# Patient Record
Sex: FEMALE | Race: WHITE
[De-identification: names, ages, dates, MRNs, and addresses within clinical notes are randomized per-mention and may not be internally consistent; named-entity substitution may affect disease eponyms.]

---

## 2021-09-15 ENCOUNTER — HOSPITAL ENCOUNTER (OUTPATIENT)
Dept: HOSPITAL 75 - PREOP | Age: 56
LOS: 1 days | End: 2021-09-16
Attending: ORTHOPAEDIC SURGERY
Payer: MEDICARE

## 2021-09-15 VITALS — DIASTOLIC BLOOD PRESSURE: 74 MMHG | SYSTOLIC BLOOD PRESSURE: 107 MMHG

## 2021-09-15 VITALS — WEIGHT: 184.75 LBS | HEIGHT: 64.02 IN | BODY MASS INDEX: 31.54 KG/M2

## 2021-09-15 DIAGNOSIS — Z01.812: Primary | ICD-10-CM

## 2021-09-15 DIAGNOSIS — M17.12: ICD-10-CM

## 2021-09-15 LAB
ALBUMIN SERPL-MCNC: 3.9 GM/DL (ref 3.2–4.5)
ALP SERPL-CCNC: 65 U/L (ref 40–136)
ALT SERPL-CCNC: 16 U/L (ref 0–55)
AMORPH SED URNS QL MICRO: (no result) /LPF
APTT PPP: YELLOW S
BACTERIA #/AREA URNS HPF: (no result) /HPF
BASOPHILS # BLD AUTO: 0 10^3/UL (ref 0–0.1)
BASOPHILS NFR BLD AUTO: 1 % (ref 0–10)
BILIRUB SERPL-MCNC: 0.2 MG/DL (ref 0.1–1)
BILIRUB UR QL STRIP: NEGATIVE
BUN/CREAT SERPL: 26
CALCIUM SERPL-MCNC: 9.4 MG/DL (ref 8.5–10.1)
CHLORIDE SERPL-SCNC: 107 MMOL/L (ref 98–107)
CO2 SERPL-SCNC: 21 MMOL/L (ref 21–32)
CREAT SERPL-MCNC: 0.93 MG/DL (ref 0.6–1.3)
EOSINOPHIL # BLD AUTO: 0.2 10^3/UL (ref 0–0.3)
EOSINOPHIL NFR BLD AUTO: 4 % (ref 0–10)
ERYTHROCYTE [SEDIMENTATION RATE] IN BLOOD: 20 MM/HR (ref 0–30)
FIBRINOGEN PPP-MCNC: CLEAR MG/DL
GFR SERPLBLD BASED ON 1.73 SQ M-ARVRAT: 62 ML/MIN
GLUCOSE SERPL-MCNC: 83 MG/DL (ref 70–105)
GLUCOSE UR STRIP-MCNC: NEGATIVE MG/DL
HCT VFR BLD CALC: 40 % (ref 35–52)
HGB BLD-MCNC: 12.7 G/DL (ref 11.5–16)
INR PPP: 0.9 (ref 0.8–1.4)
KETONES UR QL STRIP: NEGATIVE
LEUKOCYTE ESTERASE UR QL STRIP: NEGATIVE
LYMPHOCYTES # BLD AUTO: 1.2 10^3/UL (ref 1–4)
LYMPHOCYTES NFR BLD AUTO: 28 % (ref 12–44)
MANUAL DIFFERENTIAL PERFORMED BLD QL: NO
MCH RBC QN AUTO: 28 PG (ref 25–34)
MCHC RBC AUTO-ENTMCNC: 32 G/DL (ref 32–36)
MCV RBC AUTO: 88 FL (ref 80–99)
MONOCYTES # BLD AUTO: 0.4 10^3/UL (ref 0–1)
MONOCYTES NFR BLD AUTO: 10 % (ref 0–12)
NEUTROPHILS # BLD AUTO: 2.6 10^3/UL (ref 1.8–7.8)
NEUTROPHILS NFR BLD AUTO: 58 % (ref 42–75)
NITRITE UR QL STRIP: NEGATIVE
PH UR STRIP: 6 [PH] (ref 5–9)
PLATELET # BLD: 261 10^3/UL (ref 130–400)
PMV BLD AUTO: 10.4 FL (ref 9–12.2)
POTASSIUM SERPL-SCNC: 4.7 MMOL/L (ref 3.6–5)
PROT SERPL-MCNC: 6.8 GM/DL (ref 6.4–8.2)
PROT UR QL STRIP: NEGATIVE
PROTHROMBIN TIME: 12 SEC (ref 12.2–14.7)
RBC #/AREA URNS HPF: (no result) /HPF
SODIUM SERPL-SCNC: 137 MMOL/L (ref 135–145)
SP GR UR STRIP: >=1.03 (ref 1.02–1.02)
SQUAMOUS #/AREA URNS HPF: (no result) /HPF
WBC # BLD AUTO: 4.4 10^3/UL (ref 4.3–11)
WBC #/AREA URNS HPF: (no result) /HPF

## 2021-09-15 PROCEDURE — 36415 COLL VENOUS BLD VENIPUNCTURE: CPT

## 2021-09-15 PROCEDURE — 86900 BLOOD TYPING SEROLOGIC ABO: CPT

## 2021-09-15 PROCEDURE — 86901 BLOOD TYPING SEROLOGIC RH(D): CPT

## 2021-09-15 PROCEDURE — 85610 PROTHROMBIN TIME: CPT

## 2021-09-15 PROCEDURE — 71046 X-RAY EXAM CHEST 2 VIEWS: CPT

## 2021-09-15 PROCEDURE — 87081 CULTURE SCREEN ONLY: CPT

## 2021-09-15 PROCEDURE — 80053 COMPREHEN METABOLIC PANEL: CPT

## 2021-09-15 PROCEDURE — 81000 URINALYSIS NONAUTO W/SCOPE: CPT

## 2021-09-15 PROCEDURE — 87088 URINE BACTERIA CULTURE: CPT

## 2021-09-15 PROCEDURE — 85025 COMPLETE CBC W/AUTO DIFF WBC: CPT

## 2021-09-15 PROCEDURE — 86850 RBC ANTIBODY SCREEN: CPT

## 2021-09-15 PROCEDURE — 85652 RBC SED RATE AUTOMATED: CPT

## 2021-09-15 NOTE — DIAGNOSTIC IMAGING REPORT
INDICATION:  preop left knee replacement.  



TECHNIQUE:  Two view chest   12:49 PM



CORRELATION STUDY:   None



FINDINGS: 

The heart size, mediastinal configuration and pulmonary

vasculature are within normal limits.  

The lungs are clear with no consolidating infiltrate. There is no

significant pleural effusion or pneumothorax.  

Visualized osseous structures are unremarkable.



IMPRESSION: 

1. Negative for acute abnormality of the chest.



Dictated by: 



  Dictated on workstation # [id number]

## 2021-09-16 NOTE — HISTORY AND PHYSICAL
DATE OF SERVICE:  



ADMISSION HISTORY AND PHYSICAL



DATE OF ADMISSION:

2021.



Her date of service and surgery and date of admission will be 2021 for a

left total knee arthroplasty.  The patient will require regular inpatient

admission due to comorbidities, pain management and need for physical therapy.



HISTORY OF PRESENT ILLNESS:

The patient is a 56-year-old female with progressively worsening left knee pain.

 Radiographs reveal severe tricompartmental osteoarthritis.  She has undergone

treatment with injections, anti-inflammatories and rest without relief.  Due to

functional impairment and failure to improve with conservative measures, the

patient elected to proceed with surgical intervention.



REVIEW OF SYSTEMS:

No chest pain, no shortness of breath, and no dysuria.



PAST MEDICAL HISTORY:

Back pain, constipation, reflux, allergic rhinitis, depression, hypothyroidism,

seizure disorder, and bipolar disorder.



PAST SURGICAL HISTORY:

Left shoulder arthroscopy, , tubal ligation, and left wrist ORIF.



FAMILY HISTORY:

Significant for diabetes, hypothyroidism, and coronary artery disease.



PRIMARY CARE PROVIDER:

Kishor Juarez through [facility].



MEDICATIONS:

Levothyroxine, Ambien and promethazine.



ALLERGIES:

CARIPRAZINE.



SOCIAL HISTORY:

The patient denies alcohol and tobacco use.



PHYSICAL EXAMINATION:

GENERAL:  The patient is well-developed, well-nourished, in no acute distress.

HEENT:  Normocephalic and atraumatic.  Pupils are equal, round and reactive to

light.  Oropharynx is clear.

LUNGS:  Clear to auscultation bilaterally.

HEART:  Regular rate and rhythm.

ABDOMEN:  Soft, nontender, and nondistended.

EXTREMITIES:  The left knee demonstrates varus alignment.  She has marked

tenderness along the medial joint line.  She has patellofemoral crepitus.  No

varus or valgus laxity.  Negative anterior and posterior drawer.  Slight

effusions noted.  Range of motion is 0/2/120.



IMPRESSION:

Severe left knee osteoarthritis, unresponsive to conservative measures.



PLAN:

Left total knee arthroplasty.  The risks, benefits, options, ramifications and

recovery have been discussed at length with the patient.  She understands and

wishes to proceed.





Job ID: [id number]

DocumentID: [id number]

Dictated Date:  2021 12:29:38

Transcription Date: 2021 12:52:36

Dictated By: STONEY GASPAR MD

## 2021-09-22 ENCOUNTER — HOSPITAL ENCOUNTER (INPATIENT)
Dept: HOSPITAL 75 - 4TH | Age: 56
LOS: 3 days | Discharge: HOME HEALTH | DRG: 470 | End: 2021-09-25
Attending: ORTHOPAEDIC SURGERY | Admitting: ORTHOPAEDIC SURGERY
Payer: MEDICARE

## 2021-09-22 VITALS — SYSTOLIC BLOOD PRESSURE: 138 MMHG | DIASTOLIC BLOOD PRESSURE: 93 MMHG

## 2021-09-22 VITALS — SYSTOLIC BLOOD PRESSURE: 133 MMHG | DIASTOLIC BLOOD PRESSURE: 85 MMHG

## 2021-09-22 VITALS — DIASTOLIC BLOOD PRESSURE: 78 MMHG | SYSTOLIC BLOOD PRESSURE: 145 MMHG

## 2021-09-22 VITALS — HEIGHT: 63.78 IN | BODY MASS INDEX: 31.93 KG/M2 | WEIGHT: 184.75 LBS

## 2021-09-22 VITALS — DIASTOLIC BLOOD PRESSURE: 87 MMHG | SYSTOLIC BLOOD PRESSURE: 144 MMHG

## 2021-09-22 VITALS — SYSTOLIC BLOOD PRESSURE: 131 MMHG | DIASTOLIC BLOOD PRESSURE: 86 MMHG

## 2021-09-22 VITALS — DIASTOLIC BLOOD PRESSURE: 91 MMHG | SYSTOLIC BLOOD PRESSURE: 142 MMHG

## 2021-09-22 VITALS — DIASTOLIC BLOOD PRESSURE: 91 MMHG | SYSTOLIC BLOOD PRESSURE: 164 MMHG

## 2021-09-22 VITALS — SYSTOLIC BLOOD PRESSURE: 123 MMHG | DIASTOLIC BLOOD PRESSURE: 78 MMHG

## 2021-09-22 VITALS — SYSTOLIC BLOOD PRESSURE: 134 MMHG | DIASTOLIC BLOOD PRESSURE: 88 MMHG

## 2021-09-22 VITALS — DIASTOLIC BLOOD PRESSURE: 76 MMHG | SYSTOLIC BLOOD PRESSURE: 111 MMHG

## 2021-09-22 VITALS — DIASTOLIC BLOOD PRESSURE: 94 MMHG | SYSTOLIC BLOOD PRESSURE: 156 MMHG

## 2021-09-22 VITALS — SYSTOLIC BLOOD PRESSURE: 133 MMHG | DIASTOLIC BLOOD PRESSURE: 73 MMHG

## 2021-09-22 DIAGNOSIS — E03.9: ICD-10-CM

## 2021-09-22 DIAGNOSIS — M54.9: ICD-10-CM

## 2021-09-22 DIAGNOSIS — M17.12: Primary | ICD-10-CM

## 2021-09-22 DIAGNOSIS — J30.9: ICD-10-CM

## 2021-09-22 DIAGNOSIS — G40.909: ICD-10-CM

## 2021-09-22 DIAGNOSIS — F31.9: ICD-10-CM

## 2021-09-22 DIAGNOSIS — K21.9: ICD-10-CM

## 2021-09-22 PROCEDURE — 86901 BLOOD TYPING SEROLOGIC RH(D): CPT

## 2021-09-22 PROCEDURE — 86900 BLOOD TYPING SEROLOGIC ABO: CPT

## 2021-09-22 PROCEDURE — 0SRD0J9 REPLACEMENT OF LEFT KNEE JOINT WITH SYNTHETIC SUBSTITUTE, CEMENTED, OPEN APPROACH: ICD-10-PCS | Performed by: ORTHOPAEDIC SURGERY

## 2021-09-22 PROCEDURE — 73560 X-RAY EXAM OF KNEE 1 OR 2: CPT

## 2021-09-22 PROCEDURE — 94664 DEMO&/EVAL PT USE INHALER: CPT

## 2021-09-22 PROCEDURE — 36415 COLL VENOUS BLD VENIPUNCTURE: CPT

## 2021-09-22 PROCEDURE — 86850 RBC ANTIBODY SCREEN: CPT

## 2021-09-22 PROCEDURE — 85014 HEMATOCRIT: CPT

## 2021-09-22 PROCEDURE — 85018 HEMOGLOBIN: CPT

## 2021-09-22 RX ADMIN — OXYCODONE HYDROCHLORIDE AND ACETAMINOPHEN PRN TAB: 5; 325 TABLET ORAL at 23:30

## 2021-09-22 RX ADMIN — SODIUM CHLORIDE SCH MLS/HR: 900 INJECTION, SOLUTION INTRAVENOUS at 12:10

## 2021-09-22 RX ADMIN — CEFUROXIME SCH MLS/HR: 750 INJECTION, POWDER, FOR SOLUTION INTRAMUSCULAR; INTRAVENOUS at 15:37

## 2021-09-22 RX ADMIN — DOCUSATE SODIUM AND SENNOSIDES SCH EA: 8.6; 5 TABLET, FILM COATED ORAL at 11:52

## 2021-09-22 RX ADMIN — DOCUSATE SODIUM AND SENNOSIDES SCH EA: 8.6; 5 TABLET, FILM COATED ORAL at 19:27

## 2021-09-22 RX ADMIN — CEFUROXIME SCH MLS/HR: 750 INJECTION, POWDER, FOR SOLUTION INTRAMUSCULAR; INTRAVENOUS at 23:30

## 2021-09-22 RX ADMIN — OXYCODONE HYDROCHLORIDE AND ACETAMINOPHEN PRN TAB: 5; 325 TABLET ORAL at 12:06

## 2021-09-22 RX ADMIN — SODIUM CHLORIDE SCH MLS/HR: 900 INJECTION, SOLUTION INTRAVENOUS at 20:40

## 2021-09-22 RX ADMIN — SODIUM CHLORIDE, SODIUM LACTATE, POTASSIUM CHLORIDE, AND CALCIUM CHLORIDE PRN MLS/HR: 600; 310; 30; 20 INJECTION, SOLUTION INTRAVENOUS at 06:52

## 2021-09-22 RX ADMIN — SODIUM CHLORIDE, SODIUM LACTATE, POTASSIUM CHLORIDE, AND CALCIUM CHLORIDE PRN MLS/HR: 600; 310; 30; 20 INJECTION, SOLUTION INTRAVENOUS at 07:55

## 2021-09-22 NOTE — PROGRESS NOTE
Standard Progress Note


Progress Notes/Assess & Plan


Date Seen by a Provider:  Sep 22, 2021


Time Seen by a Provider:  09:30


Progress/Assessment & Plan


post  op check


no complaints


radiographs--HW well positioned without fracture


LLE--dressing intact. 2 plus DP pulse with brisk cap refill. INtact DF and PF of

toes and ankle. Sensation intact to light touch throughout


s/p LTKA


mobilize as able











STONEY GASPAR MD            Sep 22, 2021 10:15

## 2021-09-22 NOTE — XMS REPORT
Clinical Summary

                             Created on: 2021



Riri Patino

External Reference #: [id number]

: 1965

Sex: Female



Demographics





                          Address                   [address] [place]  [address]

 

                          Home Phone                [phone]

 

                          Preferred Language        English

 

                          Marital Status            

 

                          Adventism Affiliation     Unknown

 

                          Race                      White

 

                          Ethnic Group              Not  or 





Author





                          Author                    [facility]

 

                          Organization              [facility]

 

                          Address                   Unknown

 

                          Phone                     Unavailable







Support





                Name            Relationship    Address         Phone

 

                No Contact      [employer]            Unknown         [phone]







Care Team Providers





                    Care Team Member Name Role                Phone

 

                          PCP                       Unavailable







Allergies

Not on File



Medications

Not on file



Active Problems





Not on file



Social History





                                        Date



                 Tobacco Use     Types           Packs/Day       Years Used 

 

                                         



                                         Never Assessed    







 



                           Sex Assigned at Birth     Date Recorded

 

 



                                         Not on file 







Last Filed Vital Signs

Not on file



Plan of Treatment





   



                 Health Maintenance  Due Date        Last Done       Comments

 

   



                           Hepatitis C Screen        1965  

 

   



                           Td/Tdap#                  1965  

 

   



                           COVID-19 Vaccine (1)      1977  

 

   



                           Cervical Cancer Screening  1986  



                                         via Pap Smear   

 

   



                           Colorectal Screening via  2015  



                                         Colonoscopy   

 

   



                           Mammogram Screening       2015  

 

   



                           Zoster Vaccine# (1 of 2)  2015  

 

   



                     Influenza Vaccine (#1)  10/01/2021          10/25/2019, 



                                         10/25/2019, 



                                         10/11/2018, 



                                         Additional 



                                         history 



                                         exists 

 

   



                     Pneumococcal Vaccine:  Aged Out            No longer eligib

le based on patient's age to



                           Pediatrics (0 to 5 Years)    complete this topic



                                         and At-Risk Patients (6   



                                         to 64 Years)   







Results

Not on filefrom Last 3 Months



Insurance





                                        Type



            Payer      Benefit    Subscriber ID  Effective  Phone      Address 



                           Plan /                    Dates   



                                         Group     

 

                                         



                 WORKERS COMPENSATION  MISC WORK       [id number]      

3   



                           COMP                      -Present   







     



            Guarantor Name  Account    Relation to  Date of    Phone      Billin

g Address



                     Type                Patient             Birth  

 

     



              Riri Patino  Workers      Self         1965   [address] [place] [address]







Advance Directives





For more information, please contact: [phone]







                          Patient Representative    Explanation



                           Type                      Date Recorded  

 

                                                     



                                         Health Care   



                                         Directive

## 2021-09-22 NOTE — OPERATIVE REPORT
DATE OF SERVICE:  09/22/2021



PREOPERATIVE DIAGNOSIS:

Left knee primary osteoarthritis.



POSTOPERATIVE DIAGNOSIS:

Left knee primary osteoarthritis.



PROCEDURE:

Left total knee arthroplasty.



SURGEON:

Artis Gaspar MD



ASSISTANT:

Paramjit Keene, who assisted throughout the procedure and closed the incision.



ANESTHESIA:

General endotracheal by Se Dailey CRNA.



TOURNIQUET TIME:

Approximately 65 minutes at 300 mmHg.



ESTIMATED BLOOD LOSS:

Minimal.



DRAINS:

None.



COMPLICATIONS:

None.



POSTOPERATIVE PLAN:

Routine total knee arthroplasty protocol.  The patient was transferred to the

recovery room awake and in stable condition.



STATEMENT OF MEDICAL NECESSITY:

The patient is a 56-year-old female with long-standing left knee pain.  She has

undergone treatment with injections, anti-inflammatories and rest without

relief.  Radiographs revealed severe osteoarthritis primarily in her medial and

patellofemoral compartments.  Due to functional impairment and failure to

improve with conservative measures, the patient elected to proceed with surgical

intervention.



DESCRIPTION OF PROCEDURE:

After risks and benefits of procedure were discussed and questions were

answered, an informed consent was signed and placed on chart, the operative site

was confirmed in the preoperative holding area initialed by the surgeon.  The

patient was then transferred to the operating room and after adequate levels of

general endotracheal anesthetic were obtained, a timeout was called, confirming

the operative site and examination under anesthesia was performed and the left

lower extremity was prepped and draped in the usual sterile fashion.  With the

leg elevated and the knee flexed, the tourniquet was inflated to 300 mmHg. 

Standard anterior approach was utilized.  Hemostasis was obtained with cautery. 

Medial parapatellar arthrotomy was performed leaving 1 cm cuff on the patella

for later reattachment.  A portion of the fat pad was resected.  Subperiosteal

release was performed in the proximal medial tibia with curved osteotome.  The

ACL was resected.  The intramedullary guide was passed into the distal femur. 

The distal cut was made and the femur sized to a size 3.  The 3 cutting block

was placed parallel to the epicondylar axis and the cuts were made from

posterior to anterior.  Subperiosteal release was then carefully performed on

the posterior distal femur being careful to stay on the bony surface.  The

intramedullary guide was then passed into the tibia.  The cutting block was

pinned into position.  The drop damaso transected the intermalleolar axis and the

cut was made.  This was then prepared with the drill and keel punch using the

three block again ensuring that the drop damaso transected the intermalleolar axis.

 The femoral trial was placed and the trochlear cut was made.  A 10 mm insert

was placed and the patella was prepared by resecting 10 mm off, the undersurface

was then prepared with peg guide.  The trials were inserted with a 32 mm trial

in the patella.  Full extension was easily obtained, 120 degrees of flexion with

gravity was easily obtained.  There was no anterior/posterior or medial/lateral

laxity in flexion or extension.  The patella tracked well.  The trials were

removed.  The joint was irrigated with pulse lavage as were the bone ends.  The

periarticular block was placed in the posterior capsule, medial and lateral

retinaculum extensor mechanism and subcutaneous tissues.  The bone ends were

further irrigated and dried.  The tibial baseplate was cemented into position. 

Excessive cement was removed, the superior surface was irrigated and dried.  The

polyethylene insert was placed.  The distal femur was irrigated and dried.  The

final femoral prosthesis was cemented into position.  Excessive cement was

removed.  The knee was brought out into full extension and held until the cement

had cured.  The undersurface of patella was irrigated and dried and the patellar

button was cemented into position.  Once the cement had cured, the knee was

taken through range of motion and the patella tracked well.  Full extension was

easily obtained, 120 degrees of flexion with gravity was easily obtained.  There

was no anterior/posterior or medial/lateral laxity in flexion or extension.  The

joint was further irrigated with pulse lavage.  Arthrotomy was closed with #2

Tevdek in figure-of-eight interrupted fashion.  Knee was flexed.  The repair was

stable with patella tracking well.  Subcutaneous tissues were irrigated using a

total of 6 liters throughout the procedure.  A 0 Vicryl was used for deep

subcutaneous tissue, 2-0 Vicryl for the superficial subcutaneous tissue, staples

were used on the skin.  A soft dressing was applied.  The tourniquet was

deflated and the patient was transferred to the recovery room awake and in

stable condition.





Job ID: [id number]

DocumentID: [id number]

Dictated Date:  09/22/2021 09:15:39

Transcription Date: 09/22/2021 14:06:58

Dictated By: ARTIS GASPAR MD

## 2021-09-22 NOTE — PROGRESS NOTE-PRE OPERATIVE
Pre-Operative Progress Note


H&P Reviewed


The H&P was reviewed, patient examined and no changes noted.


Date Seen by Provider:  Sep 22, 2021


Time Seen by Provider:  07:20


Date H&P Reviewed:  Sep 22, 2021


Time H&P Reviewed:  07:11


Pre-Operative Diagnosis:  left knee primary osteoarthritis











STONEY GASPAR MD            Sep 22, 2021 07:32

## 2021-09-22 NOTE — PHYSICAL THERAPY EVALUATION
PT Evaluation-General


Medical Diagnosis


Admission Date


Sep 22, 2021 at 05:54


Medical Diagnosis:  Left TKA


Onset Date:  Sep 21, 2021





Therapy Diagnosis


Therapy Diagnosis:  Gait deficit, strength deficit





Precautions


Precautions/Isolations:  Fall Prevention, Standard Precautions





Weight Bear Status


Left Lower Extremity:  Left


Weight Bearing/Tolerated





Referral


Physician:  Dr. Torres


Reason for Referral:  Evaluation/Treatment





Social History


Home:  [facility]


Current Living Status:  Parents


Entry Into Home:  Stairs With Railing


PT Steps Into Home:  1


PT Steps Inside Home:  15





Prior


Prior Level of Function


SCALE: Activities may be completed with or without assistive devices.





6-Indepedent-patient completes the activity by him/herself with no assistance 

from a helper.


5-Set-up or Clean-up Assistance-helper sets up or cleans up; patient completes 

activity. Crocker assists only prior to or  


    following the activity.


4-Supervision or Touching Assistance-helper provides verbal cues and/or 

touching/steadying and/or contact guard assistance as patient completes 

activity. Assistance may be provided   


    throughout the activity or intermittently.


3-Partial/Moderate Assistance-helper does LESS THAN HALF the effort. Crocker 

lifts, holds or supports trunk or limbs, but provides less than half the effort.


2-Substantial/Maximal Assistance-helper does MORE THAN HALF the effort. Crocker 

lifts or holds trunk or limbs and provides more than half the effort.


1-Fwrjyliun-zgffum does ALL the effort. Patient does none of the effort to 

complete the activity. Or, the assistance of 2 or more helpers is required for 

the patient to complete the  


    activity.


If activity was not attempted, code reason:


7-Patient Refused.


9-Not Applicable-not attempted and the patient did not perform the activity 

before the current illness, exacerbation or injury.


10-Not Attempted due to Environmental Limitations-(lack of equipment, weather 

restraints, etc.).


88-Not Attempted due to Medical Conditions or Safety Concerns.


Bed Mobility:  6


Transfers (B,C,W/C):  6


Gait:  6


Stairs:  6


Indoor Mobility (Ambulation):  Independent


Stairs:  Independent





PT Evaluation-Current


Subjective


Patient lying supine in bed upon PT arrival, agreeable to treatment.  Patient 

reports 4/10 pain in left knee.





ROM/Strength


ROM Lower Extremities


Left knee extension 10 degrees from neutral 


Left knee flexion 70 degrees


Strength Lower Extremities


Right LE 4/5 grossly


Left LE 3-/5 all available planes





Sensory


Vision:  Functional


Hearing:  Functional


Sensation Right Lower Extremit:  Intact


Sensation Left Lower Extremity:  Impaired


Sensation Lower Extremities


Patient reports she is unable to feel light touch below left knee





Transfers


Roll Left to Right (QC):  5


Sit to Lying (QC):  5


Lying to Sitting/Side of Bed(Q:  5


Sit to Stand (QC):  5


Chair/Bed-to-Chair Xfer(QC):  4





Gait


Does the Patient Walk?:  Yes


Mode of Locomotion:  Walk


Anticipated Mode of Locomotion:  Walk


Walk 10 feet (QC):  4


Distance:  15


Gait Assistive Device:  FWW





Balance


Sitting Static:  Good


Sitting Dynamic:  Fair


Standing Static:  Fair





Assessment/Needs


Patient tolerated treatment well.  She performed all observed bed mobility and 

transfers with SBA/CGA.  Patient ambulates 15 feet to the chair, with FWW, with 

CGA and verbal cues for safety, progression of left LE and use of FWW.  Patient 

ambulates with antalgic gait pattern, little to no stance time on the left LE, 

with hop to gait pattern on the right LE.  Patient in chair post treatment with 

all needs met, nursing notified, call light in reach.  CPM fit appropriately to 

left knee and set at 0 degrees extension and 75 Degrees flexion.  Ran the cynthia

e through 4 cycles to ensure fit was appropriate.


Rehab Potential:  Good





PT Long Term Goals


Long Term Goals


PT Long Term Goals Time Frame:  Sep 29, 2021


Roll Left & Right (QC):  6


Sit to Lying (QC):  6


Lying-Sitting on Side/Bed(QC):  6


Sit to Stand (QC):  6


Chair/Bed-to-Chair Xfer(QC):  6


Toilet Transfer (QC):  6


Car Transfer (QC):  6


Does the Patient Walk:  Yes


Walk 10 feet (QC):  6


Walk 50ft with 2 Turns (QC):  6


Walk 150 ft (QC):  5


1 Step (curb) (QC):  4


4 Steps (QC):  4


12 Steps (QC):  4





PT Plan


Problem List


Problem List:  Activity Tolerance, Functional Strength, Safety, Balance, Gait, 

Transfer, Bed Mobility, ROM





Treatment/Plan


Treatment Plan:  Continue Plan of Care


Treatment Plan:  Bed Mobility, Education, Functional Activity Krupa, Functional 

Strength, Group Therapy, Gait, Safety, Therapeutic Exercise, Transfers


Treatment Duration:  Nov 24, 2021


Frequency:  11 times per week


Estimated Hrs Per Day:  .25 hour per day





Safety Risks/Education


Patient Education:  Gait Training, Transfer Techniques, Reviewed Precautions


Teaching Recipient:  Patient


Teaching Methods:  Demonstration, Discussion


Response to Teaching:  Verbalize Understanding, Return Demonstration





Discharge Recommendations


Equpiment Recommendations-D/C:  Front Wheeled Walker





Time/GCodes


Time In:  1325


Time Out:  1355


Total Billed Treatment Time:  30


Total Billed Treatment


Visit, Jens Altman, CPM











JAMIE GOODMAN PT                Sep 22, 2021 14:29

## 2021-09-22 NOTE — PROGRESS NOTE-POST OPERATIVE
Post-Operative Progess Note


Surgeon (s)/Assistant (s)


Surgeon


STONEY GASPAR MD


Assistant:  Paramjit Keene





Pre-Operative Diagnosis


left knee primary osteoarthritis





Post-Operative Diagnosis





left knee primary osteoarthritis





Procedure & Operative Findings


Date of Procedure


9/22/21


Procedure Performed/Findings


left total knee arthroplasty


Anesthesia Type


GETA





Estimated Blood Loss


Estimated blood loss (mL):  minimal





Specimens/Packing


Specimens Removed


none


Packing:  


none











STONEY GASPAR MD            Sep 22, 2021 07:32

## 2021-09-22 NOTE — D/C HH FACE TO FACE ORDER
D/C  Face to Face Orders


Reconcile Patient Problems


Problems Reviewed?:  Yes





Instructions for Patient


[facility], [phone]


Patient Instructions/FollowUp:  


three weeks


Physician to follow Patient:  three weeks


Discharge Diet for Home:  Regular Diet





Patient Data-Allergies,Ht & Wt


Patient Allergies:  


Coded Allergies:  


     lurasidone (Verified  Allergy, Unknown, "wanted to kill people", 9/15/21)


     propranolol (Verified  Allergy, Unknown, swollen eyes and dizziness, 

9/15/21)





Home Health Need/Face to Face


Date of Face to Face:  Sep 22, 2021


Clinical Findings:  Instability, Muscle weakness, Pain with ambulation, Unsteady

gait


I have seen Pt face-to-face:  Yes


Discharged To:  Home


Diagnosis/Conditions:  


left total knee arthroplasty


Patient is Homebound due to:  Negrita fall risk due to instabilty, Muscle weaknes

s, Pain w/ambulation


Homebound Status


   Due to the above stated illness, injury or surgical procedure (medical 

condition or diagnosis) and associated clinical findings, the patient is 

homebound because of his/her inability to leave home except with aid of a 

supportive device and/or person AND leaving the home requires a considerable and

taxing effort or is medically contraindicated.


Pt req the following assistanc:  Walker





Home Health Nursing Orders


Home Health Services Order:  Physical Therapy-Evaluate & Treat





DC left knee staples and apply steri strips 10/6/21





Home Health Infusion Therapy


Line Start Date:  Sep 22, 2021





Therapy Orders


Therapy Orders:  Physical Therapy, PT to assess for OT


Therapy Specific Orders:  Eval assistive deivces, Teach enviro 

modifications/safety, Gait training, Increase strength/endurance, Provider 

maintenance therapy, Restore ROM


Certify Stmt


I certify that this patient is under my care and that I, a nurse practitioner or

a physician; a assistant working with me, had a face to face encounter that -

meets the physician face to face encounter requirements with this patient as 

dated.











STONEY GASPAR MD            Sep 22, 2021 07:35

## 2021-09-22 NOTE — DIAGNOSTIC IMAGING REPORT
INDICATION: Osteoarthritis.



2 views were obtained.



FINDINGS: There are postsurgical changes of left knee

arthroplasty. Hardware is in satisfactory position. There is no

fracture or dislocation.



IMPRESSION: Stable postsurgical changes of left knee

arthroplasty.



Dictated by: 



  Dictated on workstation # [id number]

## 2021-09-23 VITALS — DIASTOLIC BLOOD PRESSURE: 85 MMHG | SYSTOLIC BLOOD PRESSURE: 123 MMHG

## 2021-09-23 VITALS — SYSTOLIC BLOOD PRESSURE: 133 MMHG | DIASTOLIC BLOOD PRESSURE: 87 MMHG

## 2021-09-23 VITALS — DIASTOLIC BLOOD PRESSURE: 87 MMHG | SYSTOLIC BLOOD PRESSURE: 151 MMHG

## 2021-09-23 VITALS — DIASTOLIC BLOOD PRESSURE: 81 MMHG | SYSTOLIC BLOOD PRESSURE: 140 MMHG

## 2021-09-23 VITALS — SYSTOLIC BLOOD PRESSURE: 131 MMHG | DIASTOLIC BLOOD PRESSURE: 82 MMHG

## 2021-09-23 VITALS — SYSTOLIC BLOOD PRESSURE: 102 MMHG | DIASTOLIC BLOOD PRESSURE: 66 MMHG

## 2021-09-23 LAB
HCT VFR BLD CALC: 34 % (ref 35–52)
HGB BLD-MCNC: 10.7 G/DL (ref 11.5–16)

## 2021-09-23 RX ADMIN — OXYCODONE HYDROCHLORIDE AND ACETAMINOPHEN PRN TAB: 5; 325 TABLET ORAL at 08:05

## 2021-09-23 RX ADMIN — OXYCODONE HYDROCHLORIDE AND ACETAMINOPHEN PRN TAB: 5; 325 TABLET ORAL at 23:38

## 2021-09-23 RX ADMIN — ZOLPIDEM TARTRATE PRN MG: 5 TABLET ORAL at 23:38

## 2021-09-23 RX ADMIN — ASPIRIN SCH MG: 81 TABLET ORAL at 08:05

## 2021-09-23 RX ADMIN — Medication SCH EA: at 05:25

## 2021-09-23 RX ADMIN — ENOXAPARIN SODIUM SCH MG: 30 INJECTION SUBCUTANEOUS at 19:41

## 2021-09-23 RX ADMIN — OXYCODONE HYDROCHLORIDE AND ACETAMINOPHEN PRN TAB: 5; 325 TABLET ORAL at 16:02

## 2021-09-23 RX ADMIN — OXYCODONE HYDROCHLORIDE AND ACETAMINOPHEN PRN TAB: 5; 325 TABLET ORAL at 17:37

## 2021-09-23 RX ADMIN — SODIUM CHLORIDE SCH MLS/HR: 900 INJECTION, SOLUTION INTRAVENOUS at 17:08

## 2021-09-23 RX ADMIN — DOCUSATE SODIUM AND SENNOSIDES SCH EA: 8.6; 5 TABLET, FILM COATED ORAL at 19:41

## 2021-09-23 RX ADMIN — OXYCODONE HYDROCHLORIDE AND ACETAMINOPHEN PRN TAB: 5; 325 TABLET ORAL at 03:35

## 2021-09-23 RX ADMIN — DOCUSATE SODIUM AND SENNOSIDES SCH EA: 8.6; 5 TABLET, FILM COATED ORAL at 08:05

## 2021-09-23 RX ADMIN — SODIUM CHLORIDE SCH MLS/HR: 900 INJECTION, SOLUTION INTRAVENOUS at 03:31

## 2021-09-23 RX ADMIN — OXYCODONE HYDROCHLORIDE AND ACETAMINOPHEN PRN TAB: 5; 325 TABLET ORAL at 19:41

## 2021-09-23 RX ADMIN — ENOXAPARIN SODIUM SCH MG: 30 INJECTION SUBCUTANEOUS at 08:08

## 2021-09-23 RX ADMIN — OXYCODONE HYDROCHLORIDE AND ACETAMINOPHEN PRN TAB: 5; 325 TABLET ORAL at 13:16

## 2021-09-23 NOTE — PHYSICAL THERAPY DAILY NOTE
PT Daily Note-Current


Subjective


Patient in 10/10 left knee pain.  Just received pain medication.  Very tearful.





Pain





   Numeric Pain Scale:  10-Worst Possible Pain


   Location:  Left


   Location Body Site:  Knee


   Pain Description:  Acute





Mental Status


Patient Orientation:  Normal For Age


Attachments:  IV


PCA





Transfers


SCALE: Activities may be completed with or without assistive devices.





6-Indepedent-patient completes the activity by him/herself with no assistance 

from a helper.


5-Set-up or Clean-up Assistance-helper sets up or cleans up; patient completes 

activity. [place] assists only prior to or  


    following the activity.


4-Supervision or Touching Assistance-helper provides verbal cues and/or 

touching/steadying and/or contact guard assistance as patient completes 

activity. Assistance may be provided   


    throughout the activity or intermittently.


3-Partial/Moderate Assistance-helper does LESS THAN HALF the effort. [place] 

lifts, holds or supports trunk or limbs, but provides less than half the effort.


2-Substantial/Maximal Assistance-helper does MORE THAN HALF the effort. [place] 

lifts or holds trunk or limbs and provides more than half the effort.


3-Alnlcymoy-yghgdw does ALL the effort. Patient does none of the effort to 

complete the activity. Or, the assistance of 2 or more helpers is required for 

the patient to complete the  


    activity.


If activity was not attempted, code reason:


7-Patient Refused.


9-Not Applicable-not attempted and the patient did not perform the activity 

before the current illness, exacerbation or injury.


10-Not Attempted due to Environmental Limitations-(lack of equipment, weather 

restraints, etc.).


88-Not Attempted due to Medical Conditions or Safety Concerns.


Sit to Lying (QC):  6


Lying to Sitting/Side of Bed(Q:  6


Sit to Stand (QC):  6





Weight Bearing


Left Lower Extremity:  Left


Weight Bearing/Tolerated





Gait Training


Does the Patient Walk?:  Yes


Distance:  250'


Walk 10 feet (QC):  6


Walk 50 ft with 2 Turns(QC):  6


Walk 150 ft (QC):  6


Gait Assistive Device:  FWW


slow, antalgic, step to gait sequence





Exercises


Supine Ex:  Ankle pumps, Quad Set, Heel Slides, Straight leg raise


Supine Reps:  15


Seated Therapy Exercises:  Long arc quads


Seated Reps:  15





Treatments


CPM 0-80 degrees with polar pack in place.





Assessment


Patient tolerated treatment and calmed during session.  Plan dismissal tomorrow 

after PT.





PT Long Term Goals


Long Term Goals


PT Long Term Goals Time Frame:  Sep 29, 2021


Roll Left & Right (QC):  6


Sit to Lying (QC):  6


Lying-Sitting on Side/Bed(QC):  6


Sit to Stand (QC):  6


Chair/Bed-to-Chair Xfer(QC):  6


Toilet Transfer (QC):  6


Car Transfer (QC):  6


Does the Patient Walk:  Yes


Walk 10 feet (QC):  6


Walk 50ft with 2 Turns (QC):  6


Walk 150 ft (QC):  5


1 Step (curb) (QC):  4


4 Steps (QC):  4


12 Steps (QC):  4





PT Plan


Treatment/Plan


Treatment Plan:  Continue Plan of Care


Treatment Plan:  Bed Mobility, Education, Functional Activity Krupa, Functional 

Strength, Group Therapy, Gait, Safety, Therapeutic Exercise, Transfers


Treatment Duration:  Nov 24, 2021


Frequency:  11 times per week


Estimated Hrs Per Day:  .25 hour per day





Time/GCodes


Time In:  1310


Time Out:  1335


Total Billed Treatment Time:  25


Total Billed Treatment


1 visit


EX 14 min


GT 11 min











ELSI GE PT              Sep 23, 2021 14:30

## 2021-09-23 NOTE — OCCUPATIONAL THERAPY EVAL
OT Evaluation-General/PLF


Medical Diagnosis


Admission Date


Sep 22, 2021 at 05:54


Medical Diagnosis:  Left TKA


Onset Date:  Sep 21, 2021





Therapy Diagnosis


Therapy Diagnosis:  Left TKA





Precautions


Precautions/Isolations:  Fall Prevention, Standard Precautions





Weight Bear Status


Weight Bearing Restriction:  Weight Bearing/Tolerated


Location Restriction:  L LE





Referral


Physician:  Dr. Torres


Referral Reason:  Evaluation/Treatment





Medical History


Current History


Pt reports living with her parents in a multilevel home. All needs can be met on

main level. She was indep with I/adls and was not using any AD prior to 

admission. She still drives. She was helping care for her parents but reports 

that her brother will be assisting while she is healing.


Reviewed History:  Yes





Social History


Home:  Multilevel


Current Living Status:  Parents


Entry Into Home:  Stairs With Railing


 Steps Into Home:  1


 Steps Inside Home:  3





ADL-Prior Level of Function


SCALE: Activities may be completed with or without assistive devices.





6-Indepedent-patient completes the activity by him/herself with no assistance 

from a helper.


5-Set-up or Clean-up Assistance-helper sets up or cleans up; patient completes 

activity. [place] assists only prior to or  


    following the activity.


4-Supervision or Touching Assistance-helper provides verbal cues and/or 

touching/steadying and/or contact guard assistance as patient completes 

activity. Assistance may be provided   


    throughout the activity or intermittently.


3-Partial/Moderate Assistance-helper does LESS THAN HALF the effort. [place] 

lifts, holds or supports trunk or limbs, but provides less than half the effort.


2-Substantial/Maximal Assistance-helper does MORE THAN HALF the effort. [place] 

lifts or holds trunk or limbs and provides more than half the effort.


6-Mwucopxzr-stlgrp does ALL the effort. Patient does none of the effort to 

complete the activity. Or, the assistance of 2 or more helpers is required for 

the patient to complete the  


    activity.


If activity was not attempted, code reason:


7-Patient Refused.


9-Not Applicable-not attempted and the patient did not perform the activity 

before the current illness, exacerbation or injury.


10-Not Attempted due to Environmental Limitations-(lack of equipment, weather 

restraints, etc.).


88-Not Attempted due to Medical Conditions or Safety Concerns.


Self Care:  Independent


Functional Cognition:  Independent


DME/Equipment Comments


Pt reports bathroom is fully handicap accessible.


Drive Self:  Yes





OT Current Status


Subjective


Pt reports pain as 9/10 and that pain meds were given ~1 hour prior.





Appearance


Pt left supine in bed, all needs within reach.





Mental Status/Objective


Attachments:  IV





Current


Hand Dominance:  Right


Upper Extremity ROM


WFL





Other Treatments


Pt declines any OOB/EOB activities secondary to pain and currently wearing CPM 

machine. She reports being indep with donning clothes this date, RN confirms 

this. Education provided on bathing including safety and covering of incision. 

Pt reports understanding. Per PT note, pt was SBA with gait. She declines any 

further OT services at this time. OT to d/c.





Education


OT Patient Education:  Correct positioning, Modified ADL techniques, Purpose of 

tx/functional activities


Teaching Recipient:  Patient


Teaching Methods:  Discussion


Response to Teaching:  Verbalize Understanding





OT Long Term Goals


Long Term Goals


1=Demonstrate adherence to instructed precautions during ADL tasks.


2=Patient will verbalize/demonstrate understanding of assistive 

devices/modifications for ADL.


3=Patient will improve strength/tolerance for activity to enable patient to 

perform ADL's.





OT Education/Plan


Problem List/Assessment


Assessment:  No Skilled OT Needs ID'd





Discharge Recommendations


Plan/Recommendations:  Discontinue OT


Therapy Discharge Recommendati:  Home & Family





Treatment Plan/Plan of Care


Treatment,Training & Education:  Yes


Patient would benefit from OT for education, treatment and training to promote 

independence in ADL's, mobility, safety and/or upper extremity function for 

ADL's.


Plan of Care:  ADL Retraining


Treatment Duration:  Sep 23, 2021


Frequency:  1 time per week


Estimated Hrs Per Day:  .25 hour per day


Agreement:  Yes


Rehab Potential:  Good





Time/GCodes


Start Time:  11:20


Stop Time:  11:30


Total Time Billed (hr/min):  10


Billed Treatment Time


1, Ludmila Barfield OT                Sep 23, 2021 11:56

## 2021-09-23 NOTE — PHYSICAL THERAPY DAILY NOTE
PT Daily Note-Current


Subjective


Patient agrees to PT.





Mental Status


Patient Orientation:  Normal For Age


Attachments:  Polar Pack, IV





Transfers


SCALE: Activities may be completed with or without assistive devices.





6-Indepedent-patient completes the activity by him/herself with no assistance 

from a helper.


5-Set-up or Clean-up Assistance-helper sets up or cleans up; patient completes 

activity. [place] assists only prior to or  


    following the activity.


4-Supervision or Touching Assistance-helper provides verbal cues and/or 

touching/steadying and/or contact guard assistance as patient completes 

activity. Assistance may be provided   


    throughout the activity or intermittently.


3-Partial/Moderate Assistance-helper does LESS THAN HALF the effort. [place] 

lifts, holds or supports trunk or limbs, but provides less than half the effort.


2-Substantial/Maximal Assistance-helper does MORE THAN HALF the effort. [place] 

lifts or holds trunk or limbs and provides more than half the effort.


5-Crbjxefta-izhnrb does ALL the effort. Patient does none of the effort to 

complete the activity. Or, the assistance of 2 or more helpers is required for 

the patient to complete the  


    activity.


If activity was not attempted, code reason:


7-Patient Refused.


9-Not Applicable-not attempted and the patient did not perform the activity 

before the current illness, exacerbation or injury.


10-Not Attempted due to Environmental Limitations-(lack of equipment, weather 

restraints, etc.).


88-Not Attempted due to Medical Conditions or Safety Concerns.


Lying to Sitting/Side of Bed(Q:  6


Sit to Stand (QC):  6


Chair/Bed-to-Chair Xfer(QC):  6





Weight Bearing


Left Lower Extremity:  Left


Weight Bearing/Tolerated





Gait Training


Does the Patient Walk?:  Yes


Distance:  275'


Walk 10 feet (QC):  4


Walk 50 ft with 2 Turns(QC):  4


Walk 150 ft (QC):  4


Gait Assistive Device:  FWW


slow, antalgic, functional gait sequence





Exercises


Supine Ex:  Ankle pumps, Quad Set, Heel Slides, Straight leg raise


Supine Reps:  15


Seated Therapy Exercises:  Long arc quads


Seated Reps:  15





Assessment


Patient tolerated treatment well and is up in recliner with needs met.





PT Long Term Goals


Long Term Goals


PT Long Term Goals Time Frame:  Sep 29, 2021


Roll Left & Right (QC):  6


Sit to Lying (QC):  6


Lying-Sitting on Side/Bed(QC):  6


Sit to Stand (QC):  6


Chair/Bed-to-Chair Xfer(QC):  6


Toilet Transfer (QC):  6


Car Transfer (QC):  6


Does the Patient Walk:  Yes


Walk 10 feet (QC):  6


Walk 50ft with 2 Turns (QC):  6


Walk 150 ft (QC):  5


1 Step (curb) (QC):  4


4 Steps (QC):  4


12 Steps (QC):  4





PT Plan


Treatment/Plan


Treatment Plan:  Continue Plan of Care


Treatment Plan:  Bed Mobility, Education, Functional Activity Krupa, Functional 

Strength, Group Therapy, Gait, Safety, Therapeutic Exercise, Transfers


Treatment Duration:  Nov 24, 2021


Frequency:  11 times per week


Estimated Hrs Per Day:  .25 hour per day





Time/GCodes


Time In:  750


Time Out:  820


Total Billed Treatment Time:  30


Total Billed Treatment


1 visit


EX 16 min


GT 14 min











ELSI GE PT              Sep 23, 2021 10:09

## 2021-09-23 NOTE — PROGRESS NOTE
Standard Progress Note


Progress Notes/Assess & Plan


Date Seen by a Provider:  Sep 23, 2021


Time Seen by a Provider:  08:01


Progress/Assessment & Plan


post  op check


no complaints


radiographs--HW well positioned without fracture


LLE--dressing intact. 2 plus DP pulse with brisk cap refill. INtact DF and PF of

toes and ankle. Sensation intact to light touch throughout


s/p LTKA


mobilize as able


Final Diagnosis


no complaints


paresthesias resolved





Vital Signs








  Date Time  Temp Pulse Resp B/P (MAP) Pulse Ox O2 Delivery O2 Flow Rate FiO2


 


9/23/21 04:00 36.7 83 18 102/66 (78) 93 Room Air  


 


9/23/21 00:00 36.2 91 20 123/85 (98) 99 Room Air  


 


9/22/21 19:31 36.0 74 20 133/73 (93) 98 Room Air  


 


9/22/21 19:30      Room Air  


 


9/22/21 15:52 35.6 74 18 133/85 (101) 96 Room Air  


 


9/22/21 12:30   16     


 


9/22/21 11:23 36.0 70 16 144/87 (106) 94 Room Air  


 


9/22/21 10:20 36.0 81 20 164/91 (115) 97 Room Air  


 


9/22/21 10:05      Room Air  


 


9/22/21 10:05 36.4  18 145/78 (100) 94 Room Air  


 


9/22/21 10:05      Room Air  


 


9/22/21 10:00      Room Air  


 


9/22/21 10:00   18 145/78 (100) 94 Room Air  


 


9/22/21 09:50   16 134/88 (103) 94 Room Air  


 


9/22/21 09:45      Room Air  


 


9/22/21 09:40   20 142/91 (108) 100 Room Air  


 


9/22/21 09:30      OxyMask 3 


 


9/22/21 09:30   18 156/94 (114) 100 OxyMask 3 


 


9/22/21 09:20      OxyMask 5 


 


9/22/21 09:20   16 138/93 (108) 100 OxyMask 5 


 


9/22/21 09:10   12 123/78 (93) 100 OxyMask 6 


 


9/22/21 09:06      OxyMask 8 


 


9/22/21 09:06 36.7  12 111/76 (88) 96 OxyMask 8 














I & O 


 


 9/23/21





 07:00


 


Intake Total 3115 ml


 


Output Total 200 ml


 


Balance 2915 ml








Laboratory Tests








Test


 9/23/21


05:42 Range/Units


 


 


Hemoglobin 10.7 L 11.5-16.0  g/dL


 


Hematocrit 34 L 35-52  %





LLE--NVI distally. No calf tenderness


neg Aaron's


s/p LTKA doing very well


PT/OT











STONEY GASPAR MD            Sep 23, 2021 08:02

## 2021-09-23 NOTE — ANESTHESIA-REGIONAL POST-OP
Regional


Patient Condition


Mental Status:  Alert, Oriented x3


Circulation:  Same as Pre-Op


Headache:  Absent


Sensation:  Full Recovery


Motor Block:  Absent





Post Op Complications


Complications


None





Follow Up Care/Instructions


Patient Instructions


None needed.





Anesthesia/Patient Condition


Patient is doing well, no complaints, stable vital signs, no apparent adverse 

anesthesia problems.   


No complications reported per nursing.











LAWRENCE QUEEN CRNA         Sep 23, 2021 14:29

## 2021-09-24 VITALS — SYSTOLIC BLOOD PRESSURE: 130 MMHG | DIASTOLIC BLOOD PRESSURE: 77 MMHG

## 2021-09-24 VITALS — SYSTOLIC BLOOD PRESSURE: 146 MMHG | DIASTOLIC BLOOD PRESSURE: 72 MMHG

## 2021-09-24 VITALS — DIASTOLIC BLOOD PRESSURE: 67 MMHG | SYSTOLIC BLOOD PRESSURE: 144 MMHG

## 2021-09-24 VITALS — SYSTOLIC BLOOD PRESSURE: 173 MMHG | DIASTOLIC BLOOD PRESSURE: 82 MMHG

## 2021-09-24 VITALS — SYSTOLIC BLOOD PRESSURE: 133 MMHG | DIASTOLIC BLOOD PRESSURE: 90 MMHG

## 2021-09-24 VITALS — DIASTOLIC BLOOD PRESSURE: 87 MMHG | SYSTOLIC BLOOD PRESSURE: 135 MMHG

## 2021-09-24 LAB
HCT VFR BLD CALC: 30 % (ref 35–52)
HGB BLD-MCNC: 9.5 G/DL (ref 11.5–16)

## 2021-09-24 RX ADMIN — OXYCODONE HYDROCHLORIDE AND ACETAMINOPHEN PRN TAB: 5; 325 TABLET ORAL at 07:31

## 2021-09-24 RX ADMIN — OXYCODONE HYDROCHLORIDE AND ACETAMINOPHEN PRN TAB: 5; 325 TABLET ORAL at 04:59

## 2021-09-24 RX ADMIN — ENOXAPARIN SODIUM SCH MG: 30 INJECTION SUBCUTANEOUS at 20:57

## 2021-09-24 RX ADMIN — OXYCODONE HYDROCHLORIDE AND ACETAMINOPHEN PRN TAB: 5; 325 TABLET ORAL at 12:31

## 2021-09-24 RX ADMIN — SODIUM CHLORIDE SCH MLS/HR: 900 INJECTION, SOLUTION INTRAVENOUS at 07:31

## 2021-09-24 RX ADMIN — Medication SCH EA: at 04:59

## 2021-09-24 RX ADMIN — DOCUSATE SODIUM AND SENNOSIDES SCH EA: 8.6; 5 TABLET, FILM COATED ORAL at 07:31

## 2021-09-24 RX ADMIN — OXYCODONE HYDROCHLORIDE AND ACETAMINOPHEN PRN TAB: 5; 325 TABLET ORAL at 14:14

## 2021-09-24 RX ADMIN — ASPIRIN SCH MG: 81 TABLET ORAL at 07:31

## 2021-09-24 RX ADMIN — OXYCODONE HYDROCHLORIDE AND ACETAMINOPHEN PRN TAB: 5; 325 TABLET ORAL at 09:57

## 2021-09-24 RX ADMIN — ENOXAPARIN SODIUM SCH MG: 30 INJECTION SUBCUTANEOUS at 07:31

## 2021-09-24 RX ADMIN — ZOLPIDEM TARTRATE PRN MG: 5 TABLET ORAL at 23:03

## 2021-09-24 RX ADMIN — OXYCODONE HYDROCHLORIDE AND ACETAMINOPHEN PRN TAB: 5; 325 TABLET ORAL at 23:03

## 2021-09-24 RX ADMIN — OXYCODONE HYDROCHLORIDE AND ACETAMINOPHEN PRN TAB: 5; 325 TABLET ORAL at 20:57

## 2021-09-24 RX ADMIN — DOCUSATE SODIUM AND SENNOSIDES SCH EA: 8.6; 5 TABLET, FILM COATED ORAL at 20:57

## 2021-09-24 RX ADMIN — OXYCODONE HYDROCHLORIDE AND ACETAMINOPHEN PRN TAB: 5; 325 TABLET ORAL at 02:25

## 2021-09-24 RX ADMIN — OXYCODONE HYDROCHLORIDE AND ACETAMINOPHEN PRN TAB: 5; 325 TABLET ORAL at 17:46

## 2021-09-24 NOTE — PHYSICAL THERAPY DAILY NOTE
PT Daily Note-Current


Subjective


Patient reluctantly agrees to PT.  Reports 10/10 left knee patient with meds 

issued and PCA use.





Pain





   Numeric Pain Scale:  10-Worst Possible Pain


   Location:  Left


   Location Body Site:  Knee


   Pain Description:  Acute





Mental Status


Patient Orientation:  Normal For Age


Attachments:  Polar Pack, IV





Transfers


SCALE: Activities may be completed with or without assistive devices.





6-Indepedent-patient completes the activity by him/herself with no assistance 

from a helper.


5-Set-up or Clean-up Assistance-helper sets up or cleans up; patient completes 

activity. [place] assists only prior to or  


    following the activity.


4-Supervision or Touching Assistance-helper provides verbal cues and/or 

touching/steadying and/or contact guard assistance as patient completes 

activity. Assistance may be provided   


    throughout the activity or intermittently.


3-Partial/Moderate Assistance-helper does LESS THAN HALF the effort. [place] 

lifts, holds or supports trunk or limbs, but provides less than half the effort.


2-Substantial/Maximal Assistance-helper does MORE THAN HALF the effort. [place] 

lifts or holds trunk or limbs and provides more than half the effort.


9-Mqshsmnpe-dudgcd does ALL the effort. Patient does none of the effort to 

complete the activity. Or, the assistance of 2 or more helpers is required for 

the patient to complete the  


    activity.


If activity was not attempted, code reason:


7-Patient Refused.


9-Not Applicable-not attempted and the patient did not perform the activity 

before the current illness, exacerbation or injury.


10-Not Attempted due to Environmental Limitations-(lack of equipment, weather 

restraints, etc.).


88-Not Attempted due to Medical Conditions or Safety Concerns.


Lying to Sitting/Side of Bed(Q:  6


Sit to Stand (QC):  6


Chair/Bed-to-Chair Xfer(QC):  6


Toilet Transfer (QC):  6





Weight Bearing


Left Lower Extremity:  Left


Weight Bearing/Tolerated





Gait Training


Does the Patient Walk?:  Yes


Distance:  200' x 2


Walk 10 feet (QC):  6


Walk 50 ft with 2 Turns(QC):  6


Walk 150 ft (QC):  6


Gait Assistive Device:  FWW


slow, antalgic gait sequence/reciprocal pattern





Stair Training


 Stair Training: Handrails/:  2 handrails


#of Steps:  4


1 Step (curb) (QC):  6


4 Steps (QC):  6


Stairs:  Pattern:  Step to





Exercises


Supine Ex:  Ankle pumps, Quad Set, Heel Slides, Straight leg raise


Supine Reps:  15 (x 2 sets)


Seated Therapy Exercises:  Long arc quads


Seated Reps:  15 (x 2 sets)





Assessment


Patient requires much encouragement to actively perform exercises due to pain 

left knee.  Patient tolerated treatment and educated to perform exercises and to

ambulate PRN to receive maximum rehab potential.  Patient voices understanding. 

Patient will dismiss on this date.





PT Long Term Goals


Long Term Goals


PT Long Term Goals Time Frame:  Sep 29, 2021


Roll Left & Right (QC):  6


Sit to Lying (QC):  6


Lying-Sitting on Side/Bed(QC):  6


Sit to Stand (QC):  6


Chair/Bed-to-Chair Xfer(QC):  6


Toilet Transfer (QC):  6


Car Transfer (QC):  6


Does the Patient Walk:  Yes


Walk 10 feet (QC):  6


Walk 50ft with 2 Turns (QC):  6


Walk 150 ft (QC):  5


1 Step (curb) (QC):  4


4 Steps (QC):  4


12 Steps (QC):  4





PT Plan


Treatment/Plan


Treatment Plan:  Continue Plan of Care


Treatment Plan:  Bed Mobility, Education, Functional Activity Krupa, Functional 

Strength, Group Therapy, Gait, Safety, Therapeutic Exercise, Transfers


Treatment Duration:  Nov 24, 2021


Frequency:  11 times per week


Estimated Hrs Per Day:  .25 hour per day





Time/GCodes


Time In:  816


Time Out:  843


Total Billed Treatment Time:  27


Total Billed Treatment


1 visit


FA 15 min


EX 12 min











ELSI GE PT              Sep 24, 2021 11:15

## 2021-09-24 NOTE — DISCHARGE SUMMARY
DATE OF SERVICE:  



DIAGNOSES:

1.  Left knee primary osteoarthritis.

2.  Back pain.

3.  Reflux.

4.  Allergic rhinitis.

5.  Depression.

6.  Hypothyroidism.

7.  Seizure disorder.

8.  Bipolar disorder.



PROCEDURE:

Left total knee arthroplasty.



SUMMARY:

The patient is a 56-year-old female who underwent the left total knee

arthroplasty on the day of admission.  Postoperatively, she did well.  At time

of discharge, her wound was clean and dry.  She had no calf tenderness. 

Negative Homans sign.  She was tolerating a diet well and tolerating pain with

oral pain medication.



CONDITION AT DISCHARGE:

Good.



DISCHARGE DIET:

Regular.



FOLLOWUP:

Followup is in three weeks.



ACTIVITIES:

Weightbearing as tolerated with a walker as needed for pain.  Home physical

therapy will be arranged.



DISCHARGE MEDICATIONS:

Home medications, Percocet as needed for pain and one aspirin per day for 30

days.





Job ID: [id number]

DocumentID: [id number]

Dictated Date:  09/23/2021 15:47:10

Transcription Date: 09/24/2021 03:52:09

Dictated By: STONEY GASPAR MD

## 2021-09-24 NOTE — PHYSICAL THERAPY DAILY NOTE
PT Daily Note-Current


Subjective


Patient agrees to PT.  Patient continues to be very tearful due to left knee 

pain with meds issued.





Pain





   Numeric Pain Scale:  10-Worst Possible Pain


   Location:  Left


   Location Body Site:  Knee


   Pain Description:  Acute





Mental Status


Patient Orientation:  Normal For Age





Transfers


SCALE: Activities may be completed with or without assistive devices.





6-Indepedent-patient completes the activity by him/herself with no assistance 

from a helper.


5-Set-up or Clean-up Assistance-helper sets up or cleans up; patient completes 

activity. [place] assists only prior to or  


    following the activity.


4-Supervision or Touching Assistance-helper provides verbal cues and/or 

touching/steadying and/or contact guard assistance as patient completes 

activity. Assistance may be provided   


    throughout the activity or intermittently.


3-Partial/Moderate Assistance-helper does LESS THAN HALF the effort. [place] li

fts, holds or supports trunk or limbs, but provides less than half the effort.


2-Substantial/Maximal Assistance-helper does MORE THAN HALF the effort. [place] 

lifts or holds trunk or limbs and provides more than half the effort.


1-Jxbavcmnx-ghyngo does ALL the effort. Patient does none of the effort to 

complete the activity. Or, the assistance of 2 or more helpers is required for 

the patient to complete the  


    activity.


If activity was not attempted, code reason:


7-Patient Refused.


9-Not Applicable-not attempted and the patient did not perform the activity 

before the current illness, exacerbation or injury.


10-Not Attempted due to Environmental Limitations-(lack of equipment, weather 

restraints, etc.).


88-Not Attempted due to Medical Conditions or Safety Concerns.


Sit to Lying (QC):  6


Lying to Sitting/Side of Bed(Q:  6


Sit to Stand (QC):  6





Weight Bearing


Left Lower Extremity:  Left


Weight Bearing/[place]





Gait Training


Does the Patient Walk?:  Yes


Distance:  250'


Walk 10 feet (QC):  6


Walk 50 ft with 2 Turns(QC):  6


Walk 150 ft (QC):  6


Gait Assistive Device:  FWW


steady, antalgic





Exercises


Supine Ex:  Ankle pumps, Quad Set, Heel Slides, Straight leg raise


Supine Reps:  15


Seated Therapy Exercises:  Long arc quads


Seated Reps:  15





Assessment


Patient to dismiss to home this p.m.  Patient instructed to perform HEP issued 

by physician, 3/day at 15 reps.  Patient voices understanding.





PT Long Term Goals


Long Term Goals


PT Long Term Goals Time Frame:  Sep 29, 2021


Roll Left & Right (QC):  6


Sit to Lying (QC):  6


Lying-Sitting on Side/Bed(QC):  6


Sit to Stand (QC):  6


Chair/Bed-to-Chair Xfer(QC):  6


Toilet Transfer (QC):  6


Car Transfer (QC):  6


Does the Patient Walk:  Yes


Walk 10 feet (QC):  6


Walk 50ft with 2 Turns (QC):  6


Walk 150 ft (QC):  5


1 Step (curb) (QC):  4


4 Steps (QC):  4


12 Steps (QC):  4





PT Plan


Treatment/Plan


Treatment Plan:  Discontinue PT, goals met


Treatment Plan:  Bed Mobility, Education, Functional Activity Krupa, Functional 

Strength, Group Therapy, Gait, Safety, Therapeutic Exercise, Transfers


Treatment Duration:  Nov 24, 2021


Frequency:  11 times per week


Estimated Hrs Per Day:  .25 hour per day





Time/GCodes


Time In:  1330


Time Out:  1346


Total Billed Treatment Time:  16


Total Billed Treatment


1 visit


FA 16 min











ELSI GE PT              Sep 24, 2021 14:00

## 2021-09-24 NOTE — PROGRESS NOTE
Standard Progress Note


Progress Notes/Assess & Plan


Date Seen by a Provider:  Sep 24, 2021


Time Seen by a Provider:  07:02


Progress/Assessment & Plan


post  op check


no complaints


radiographs--HW well positioned without fracture


LLE--dressing intact. 2 plus DP pulse with brisk cap refill. INtact DF and PF of

toes and ankle. Sensation intact to light touch throughout


s/p LTKA


mobilize as able


Final Diagnosis


Had sig pain yday PM but much better today





Vital Signs








  Date Time  Temp Pulse Resp B/P (MAP) Pulse Ox O2 Delivery O2 Flow Rate FiO2


 


9/24/21 04:11 36.2 76 18 133/90 (104) 96 Room Air  


 


9/24/21 00:00 35.8 91 16 135/87 (103) 99 Room Air  


 


9/23/21 23:37      Room Air  


 


9/23/21 19:40      Room Air  


 


9/23/21 19:39 35.9 80 20 133/87 (102) 96 Room Air  


 


9/23/21 16:11 36.5 89 20 151/87 (108) 96 Room Air  


 


9/23/21 11:30 36.5 77 16 140/81 (100) 97 Room Air  


 


9/23/21 08:04 36.8 80 20 131/82 (98) 99 Room Air  


 


9/23/21 08:00      Room Air  














I & O 


 


 9/24/21





 07:00


 


Intake Total 2010 ml


 


Balance 2010 ml








Laboratory Tests








Test


 9/24/21


06:42 Range/Units


 


 


Hemoglobin 9.5 L 11.5-16.0  g/dL


 


Hematocrit 30 L 35-52  %





LLE--incision clean and dry. No calf tenderness. Neg Aaron's


s/p LTKA doing well


 DC today











STONEY GASPAR MD            Sep 24, 2021 07:03

## 2021-09-25 VITALS — DIASTOLIC BLOOD PRESSURE: 81 MMHG | SYSTOLIC BLOOD PRESSURE: 125 MMHG

## 2021-09-25 VITALS — DIASTOLIC BLOOD PRESSURE: 79 MMHG | SYSTOLIC BLOOD PRESSURE: 145 MMHG

## 2021-09-25 VITALS — DIASTOLIC BLOOD PRESSURE: 80 MMHG | SYSTOLIC BLOOD PRESSURE: 116 MMHG

## 2021-09-25 LAB
HCT VFR BLD CALC: 35 % (ref 35–52)
HGB BLD-MCNC: 11.3 G/DL (ref 11.5–16)

## 2021-09-25 RX ADMIN — DOCUSATE SODIUM AND SENNOSIDES SCH EA: 8.6; 5 TABLET, FILM COATED ORAL at 08:55

## 2021-09-25 RX ADMIN — OXYCODONE HYDROCHLORIDE AND ACETAMINOPHEN PRN TAB: 5; 325 TABLET ORAL at 06:22

## 2021-09-25 RX ADMIN — ENOXAPARIN SODIUM SCH MG: 30 INJECTION SUBCUTANEOUS at 08:27

## 2021-09-25 RX ADMIN — Medication SCH EA: at 06:21

## 2021-09-25 RX ADMIN — OXYCODONE HYDROCHLORIDE AND ACETAMINOPHEN PRN TAB: 5; 325 TABLET ORAL at 02:36

## 2021-09-25 RX ADMIN — ASPIRIN SCH MG: 81 TABLET ORAL at 08:27

## 2021-09-25 RX ADMIN — OXYCODONE HYDROCHLORIDE AND ACETAMINOPHEN PRN TAB: 5; 325 TABLET ORAL at 09:30

## 2021-09-25 NOTE — DISCHARGE SUMMARY
DATE OF SERVICE:  



ADDENDUM



Her date of discharge became 09/25/2021 rather than 09/24/2021 due to pain

management issues.





Job ID: [id number]

DocumentID: [id number]

Dictated Date:  09/25/2021 09:43:23

Transcription Date: 09/25/2021 10:33:10

Dictated By: STOENY GASPAR MD

## 2021-09-25 NOTE — PROGRESS NOTE
Standard Progress Note


Progress Notes/Assess & Plan


Date Seen by a Provider:  Sep 25, 2021


Time Seen by a Provider:  09:43


Progress/Assessment & Plan


post  op check


no complaints


radiographs--HW well positioned without fracture


LLE--dressing intact. 2 plus DP pulse with brisk cap refill. INtact DF and PF of

toes and ankle. Sensation intact to light touch throughout


s/p LTKA


mobilize as able


Final Diagnosis


no complaints





Vital Signs








  Date Time  Temp Pulse Resp B/P (MAP) Pulse Ox O2 Delivery O2 Flow Rate FiO2


 


9/25/21 08:00 36.2 73 20 145/79 (101) 97 Room Air  


 


9/25/21 08:00      Room Air  


 


9/25/21 04:09 36.7 93 17 116/80 (92) 97 Room Air  


 


9/25/21 00:00 36.7 90 17 125/81 (96) 96 Room Air  


 


9/24/21 20:54 36.9 86 18 144/67 (92) 96 Room Air  


 


9/24/21 20:00      Room Air  


 


9/24/21 16:41 36.9 85 20 146/72 (96) 92 Room Air  


 


9/24/21 11:47 36.7 65 18 130/77 (94) 98 Room Air  














I & O 


 


 9/25/21





 07:00


 


Intake Total 2225 ml


 


Balance 2225 ml








Laboratory Tests








Test


 9/25/21


06:45 Range/Units


 


 


Hemoglobin 11.3 L 11.5-16.0  g/dL


 


Hematocrit 35  35-52  %





LLE dressing intact. No calf tenderness. able to perform SLR


s/p LTKA


DC home











STONEY GASPAR MD            Sep 25, 2021 09:44

## 2021-11-24 ENCOUNTER — HOSPITAL ENCOUNTER (OUTPATIENT)
Dept: HOSPITAL 75 - PREOP | Age: 56
Discharge: HOME | End: 2021-11-24
Attending: ORTHOPAEDIC SURGERY
Payer: MEDICARE

## 2021-11-24 VITALS — HEIGHT: 64.02 IN | BODY MASS INDEX: 31.31 KG/M2 | WEIGHT: 183.42 LBS

## 2021-11-24 DIAGNOSIS — Z01.818: Primary | ICD-10-CM

## 2021-11-24 DIAGNOSIS — M17.11: ICD-10-CM

## 2021-11-24 LAB
ALBUMIN SERPL-MCNC: 4.2 GM/DL (ref 3.2–4.5)
ALP SERPL-CCNC: 70 U/L (ref 40–136)
ALT SERPL-CCNC: 19 U/L (ref 0–55)
APTT PPP: (no result) S
BACTERIA #/AREA URNS HPF: (no result) /HPF
BASOPHILS # BLD AUTO: 0 10^3/UL (ref 0–0.1)
BASOPHILS NFR BLD AUTO: 1 % (ref 0–10)
BILIRUB SERPL-MCNC: 0.3 MG/DL (ref 0.1–1)
BILIRUB UR QL STRIP: NEGATIVE
BUN/CREAT SERPL: 18
CALCIUM SERPL-MCNC: 9.3 MG/DL (ref 8.5–10.1)
CHLORIDE SERPL-SCNC: 103 MMOL/L (ref 98–107)
CO2 SERPL-SCNC: 26 MMOL/L (ref 21–32)
CREAT SERPL-MCNC: 1.36 MG/DL (ref 0.6–1.3)
EOSINOPHIL # BLD AUTO: 0.1 10^3/UL (ref 0–0.3)
EOSINOPHIL NFR BLD AUTO: 1 % (ref 0–10)
ERYTHROCYTE [SEDIMENTATION RATE] IN BLOOD: 11 MM/HR (ref 0–30)
FIBRINOGEN PPP-MCNC: (no result) MG/DL
GFR SERPLBLD BASED ON 1.73 SQ M-ARVRAT: 40 ML/MIN
GLUCOSE SERPL-MCNC: 93 MG/DL (ref 70–105)
GLUCOSE UR STRIP-MCNC: NEGATIVE MG/DL
HCT VFR BLD CALC: 39 % (ref 35–52)
HGB BLD-MCNC: 12.4 G/DL (ref 11.5–16)
INR PPP: 1 (ref 0.8–1.4)
KETONES UR QL STRIP: NEGATIVE
LEUKOCYTE ESTERASE UR QL STRIP: NEGATIVE
LYMPHOCYTES # BLD AUTO: 1.4 10^3/UL (ref 1–4)
LYMPHOCYTES NFR BLD AUTO: 29 % (ref 12–44)
MANUAL DIFFERENTIAL PERFORMED BLD QL: NO
MCH RBC QN AUTO: 28 PG (ref 25–34)
MCHC RBC AUTO-ENTMCNC: 32 G/DL (ref 32–36)
MCV RBC AUTO: 86 FL (ref 80–99)
MONOCYTES # BLD AUTO: 0.3 10^3/UL (ref 0–1)
MONOCYTES NFR BLD AUTO: 7 % (ref 0–12)
NEUTROPHILS # BLD AUTO: 2.9 10^3/UL (ref 1.8–7.8)
NEUTROPHILS NFR BLD AUTO: 61 % (ref 42–75)
NITRITE UR QL STRIP: NEGATIVE
PH UR STRIP: 6 [PH] (ref 5–9)
PLATELET # BLD: 326 10^3/UL (ref 130–400)
PMV BLD AUTO: 10.3 FL (ref 9–12.2)
POTASSIUM SERPL-SCNC: 4.6 MMOL/L (ref 3.6–5)
PROT SERPL-MCNC: 7.3 GM/DL (ref 6.4–8.2)
PROT UR QL STRIP: (no result)
PROTHROMBIN TIME: 13.2 SEC (ref 12.2–14.7)
RBC #/AREA URNS HPF: (no result) /HPF
SODIUM SERPL-SCNC: 137 MMOL/L (ref 135–145)
SP GR UR STRIP: >=1.03 (ref 1.02–1.02)
SQUAMOUS #/AREA URNS HPF: (no result) /HPF
WBC # BLD AUTO: 4.7 10^3/UL (ref 4.3–11)
WBC #/AREA URNS HPF: (no result) /HPF

## 2021-11-24 PROCEDURE — 80053 COMPREHEN METABOLIC PANEL: CPT

## 2021-11-24 PROCEDURE — 87081 CULTURE SCREEN ONLY: CPT

## 2021-11-24 PROCEDURE — 85025 COMPLETE CBC W/AUTO DIFF WBC: CPT

## 2021-11-24 PROCEDURE — 85610 PROTHROMBIN TIME: CPT

## 2021-11-24 PROCEDURE — 85652 RBC SED RATE AUTOMATED: CPT

## 2021-11-24 PROCEDURE — 86901 BLOOD TYPING SEROLOGIC RH(D): CPT

## 2021-11-24 PROCEDURE — 81000 URINALYSIS NONAUTO W/SCOPE: CPT

## 2021-11-24 PROCEDURE — 86850 RBC ANTIBODY SCREEN: CPT

## 2021-11-24 PROCEDURE — 36415 COLL VENOUS BLD VENIPUNCTURE: CPT

## 2021-11-24 PROCEDURE — 86900 BLOOD TYPING SEROLOGIC ABO: CPT

## 2021-11-24 PROCEDURE — 87088 URINE BACTERIA CULTURE: CPT

## 2021-11-24 NOTE — HISTORY AND PHYSICAL
DATE OF SERVICE:  



This will be for inpatient admission on 2021 for right total knee

arthroplasty.  The patient will require regular inpatient admission due to pain

management, need for physical therapy and comorbidities.



HISTORY OF PRESENT ILLNESS:

The patient is a 56-year-old female with progressively worsening right knee

pain.  Radiographs reveal severe tricompartmental osteoarthritis.  She has

undergone treatment with injections, anti-inflammatories and rest without

relief.  Due to functional impairment and failure to improve with conservative

measures, the patient elected to proceed with surgical intervention.



REVIEW OF SYSTEMS:

No chest pain, no shortness of breath, no dysuria.



PAST MEDICAL HISTORY:

Back pain, constipation, reflux, allergic rhinitis, depression, hypothyroidism,

bipolar disorder and seizure disorder.



PAST SURGICAL HISTORY:

Left shoulder arthroscopy, , tubal ligation, left wrist internal

fixation and left total knee arthroplasty.



FAMILY HISTORY:

Significant for diabetes, hypothyroidism and coronary artery disease.



PRIMARY CARE PROVIDER:

Kishor Juarez through [facility].



MEDICATIONS:

Levothyroxine, Ambien and Promethazine.



ALLERGIES:

CARIPRAZINE.



SOCIAL HISTORY:

The patient denies alcohol and tobacco use.



PHYSICAL EXAMINATION:

GENERAL:  The patient is well-developed, well-nourished, in no acute distress.

HEENT:  Normocephalic, atraumatic.  Pupils are equal, round and reactive to

light.  Oropharynx is clear.

NECK:  Supple, no lymphadenopathy.

LUNGS:  Clear to auscultation bilaterally.

HEART:  Regular rate and rhythm.

ABDOMEN:  Soft, nontender, nondistended.

EXTREMITIES:  The right knee demonstrates varus alignment.  She has marked

patellofemoral crepitus and pain with patellar loading.  No varus or valgus

laxity.  Negative anterior and posterior drawer.  Range of motion is 0/2/130.



IMPRESSION:

Severe right knee osteoarthritis.



PLAN:

Right total knee arthroplasty.  The risks, benefits, options, ramifications and

recovery were discussed at length with the patient.  She understands and wishes

to proceed.





Job ID: [id number]

DocumentID: [id number]

Dictated Date:  11/15/2021 10:56:07

Transcription Date: 11/15/2021 11:12:58

Dictated By: STONEY GASPAR MD

## 2021-12-01 ENCOUNTER — HOSPITAL ENCOUNTER (INPATIENT)
Dept: HOSPITAL 75 - 4TH | Age: 56
LOS: 2 days | Discharge: HOME HEALTH | DRG: 470 | End: 2021-12-03
Attending: ORTHOPAEDIC SURGERY | Admitting: ORTHOPAEDIC SURGERY
Payer: MEDICARE

## 2021-12-01 VITALS — DIASTOLIC BLOOD PRESSURE: 89 MMHG | SYSTOLIC BLOOD PRESSURE: 134 MMHG

## 2021-12-01 VITALS — DIASTOLIC BLOOD PRESSURE: 84 MMHG | SYSTOLIC BLOOD PRESSURE: 142 MMHG

## 2021-12-01 VITALS — DIASTOLIC BLOOD PRESSURE: 92 MMHG | SYSTOLIC BLOOD PRESSURE: 129 MMHG

## 2021-12-01 VITALS — DIASTOLIC BLOOD PRESSURE: 94 MMHG | SYSTOLIC BLOOD PRESSURE: 144 MMHG

## 2021-12-01 VITALS — HEIGHT: 63.78 IN | WEIGHT: 183.42 LBS | BODY MASS INDEX: 31.7 KG/M2

## 2021-12-01 VITALS — SYSTOLIC BLOOD PRESSURE: 135 MMHG | DIASTOLIC BLOOD PRESSURE: 89 MMHG

## 2021-12-01 VITALS — DIASTOLIC BLOOD PRESSURE: 77 MMHG | SYSTOLIC BLOOD PRESSURE: 119 MMHG

## 2021-12-01 VITALS — DIASTOLIC BLOOD PRESSURE: 88 MMHG | SYSTOLIC BLOOD PRESSURE: 132 MMHG

## 2021-12-01 VITALS — DIASTOLIC BLOOD PRESSURE: 84 MMHG | SYSTOLIC BLOOD PRESSURE: 122 MMHG

## 2021-12-01 VITALS — SYSTOLIC BLOOD PRESSURE: 122 MMHG | DIASTOLIC BLOOD PRESSURE: 77 MMHG

## 2021-12-01 VITALS — SYSTOLIC BLOOD PRESSURE: 109 MMHG | DIASTOLIC BLOOD PRESSURE: 78 MMHG

## 2021-12-01 VITALS — DIASTOLIC BLOOD PRESSURE: 69 MMHG | SYSTOLIC BLOOD PRESSURE: 134 MMHG

## 2021-12-01 DIAGNOSIS — M17.11: Primary | ICD-10-CM

## 2021-12-01 DIAGNOSIS — J30.9: ICD-10-CM

## 2021-12-01 DIAGNOSIS — Z96.652: ICD-10-CM

## 2021-12-01 DIAGNOSIS — G47.00: ICD-10-CM

## 2021-12-01 DIAGNOSIS — Z83.49: ICD-10-CM

## 2021-12-01 DIAGNOSIS — F31.9: ICD-10-CM

## 2021-12-01 DIAGNOSIS — G40.909: ICD-10-CM

## 2021-12-01 DIAGNOSIS — K59.00: ICD-10-CM

## 2021-12-01 DIAGNOSIS — E89.0: ICD-10-CM

## 2021-12-01 DIAGNOSIS — F43.10: ICD-10-CM

## 2021-12-01 DIAGNOSIS — K21.9: ICD-10-CM

## 2021-12-01 PROCEDURE — 80053 COMPREHEN METABOLIC PANEL: CPT

## 2021-12-01 PROCEDURE — 73560 X-RAY EXAM OF KNEE 1 OR 2: CPT

## 2021-12-01 PROCEDURE — 85025 COMPLETE CBC W/AUTO DIFF WBC: CPT

## 2021-12-01 PROCEDURE — 36415 COLL VENOUS BLD VENIPUNCTURE: CPT

## 2021-12-01 PROCEDURE — 86900 BLOOD TYPING SEROLOGIC ABO: CPT

## 2021-12-01 PROCEDURE — 86901 BLOOD TYPING SEROLOGIC RH(D): CPT

## 2021-12-01 PROCEDURE — 0SRC0J9 REPLACEMENT OF RIGHT KNEE JOINT WITH SYNTHETIC SUBSTITUTE, CEMENTED, OPEN APPROACH: ICD-10-PCS | Performed by: ORTHOPAEDIC SURGERY

## 2021-12-01 PROCEDURE — 86850 RBC ANTIBODY SCREEN: CPT

## 2021-12-01 RX ADMIN — SODIUM CHLORIDE SCH MLS/HR: 900 INJECTION, SOLUTION INTRAVENOUS at 13:57

## 2021-12-01 RX ADMIN — DOCUSATE SODIUM AND SENNOSIDES SCH EA: 8.6; 5 TABLET, FILM COATED ORAL at 13:36

## 2021-12-01 RX ADMIN — CEFUROXIME SCH MLS/HR: 750 INJECTION, POWDER, FOR SOLUTION INTRAMUSCULAR; INTRAVENOUS at 15:44

## 2021-12-01 RX ADMIN — SODIUM CHLORIDE, SODIUM LACTATE, POTASSIUM CHLORIDE, AND CALCIUM CHLORIDE PRN MLS/HR: 600; 310; 30; 20 INJECTION, SOLUTION INTRAVENOUS at 07:50

## 2021-12-01 RX ADMIN — OXYCODONE HYDROCHLORIDE AND ACETAMINOPHEN PRN TAB: 5; 325 TABLET ORAL at 13:59

## 2021-12-01 RX ADMIN — SODIUM CHLORIDE, SODIUM LACTATE, POTASSIUM CHLORIDE, AND CALCIUM CHLORIDE PRN MLS/HR: 600; 310; 30; 20 INJECTION, SOLUTION INTRAVENOUS at 09:32

## 2021-12-01 RX ADMIN — SODIUM CHLORIDE SCH MLS/HR: 900 INJECTION, SOLUTION INTRAVENOUS at 20:20

## 2021-12-01 RX ADMIN — DOCUSATE SODIUM AND SENNOSIDES SCH EA: 8.6; 5 TABLET, FILM COATED ORAL at 20:20

## 2021-12-01 NOTE — D/C HH FACE TO FACE ORDER
D/C  Face to Face Orders


Reconcile Patient Problems


Problems Reviewed?:  Yes





Instructions for Patient


[facility], [phone]


Patient Instructions/FollowUp:  


three weeks


Physician to follow Patient:  three weeks


Discharge Diet for Home:  Regular Diet





Patient Data-Allergies,Ht & Wt


Patient Allergies:  


Coded Allergies:  


     lurasidone (Verified  Allergy, Unknown, "wanted to kill people", 9/15/21)


     propranolol (Verified  Allergy, Unknown, swollen eyes and dizziness, 

9/15/21)


Uncoded Allergies:  


     LIBORIO AND LIBORIO COVID VACCINE (Allergy, Unknown, FACIAL SWELLING, 

11/24/21)





Home Health Need/Face to Face


Date of Face to Face:  Dec 1, 2021


Clinical Findings:  Muscle weakness, Pain with ambulation, Unsteady gait


I have seen Pt face-to-face:  Yes


Discharged To:  Home


Diagnosis/Conditions:  


right total knee arthroplasty


Patient is Homebound due to:  Muscle weakness, Pain w/ambulation


Homebound Status


   Due to the above stated illness, injury or surgical procedure (medical 

condition or diagnosis) and associated clinical findings, the patient is 

homebound because of his/her inability to leave home except with aid of a 

supportive device and/or person AND leaving the home requires a considerable and

taxing effort or is medically contraindicated.


Pt req the following assistanc:  Walker





Home Health Nursing Orders


Home Health Services Order:  Physical Therapy-Evaluate & Treat





DC right knee staples and apply steri strips 12/15/21





Therapy Orders


Therapy Orders:  Physical Therapy, PT to assess for OT


Therapy Specific Orders:  Eval assistive deivces, Teach enviro 

modifications/safety, Gait training, Increase strength/endurance, Provider 

maintenance therapy, Restore ROM


Certify Stmt


I certify that this patient is under my care and that I, a nurse practitioner or

a physician; a assistant working with me, had a face to face encounter that -

meets the physician face to face encounter requirements with this patient as 

dated.











STONEY GASPAR MD             Dec 1, 2021 07:45

## 2021-12-01 NOTE — CONSULTATION - HOSPITALIST
KELLY ROWLAND 21 1452:


HPI


History of Present Illness:


HPI/Chief Complaint


Consultation requested by Dr. Torres. Patient is being seen status-post right 

knee total arthroplasty. Patient also had left knee replacement done 21. 

Her past surgical history also includes three c-sections. Patient is still 

groggy from her surgery but feels like everything went well and is happy to have

the surgery over with. Patient states that her current medical history includes 

hypothyroidism, bipolar disorder, PTSD, insomnia, and depression. 





Her hypothyroidism stems from radiation therapy for previous hyperthyroidism. 

She states she normally takes levothyroxine 150 mcg and that has kept it well 

controlled. 





Her PTSD, bipolar disorder, and depression all stem from a divorce with her 

previous . Patient states that she is seen by ALEXANDER Starkey at 

[facility]. She does not know the name of any medications she 

normally takes for these conditions. She just knows that she normally receives 

one shot every six weeks for her PTSD and bipolar disorder, takes an oral 

medication for her depression, and an oral medication for insomnia. After her 

last knee replacement she was given zolpidem for insomnia and seemed to tolerate

that well. 





Patient is otherwise not treated for any additional chronic medical conditions. 

Patient did have compression devices on bilateral lower extremities.


Source:  patient


Date Seen


21


Attending Physician


Artis Torres MD


PCP


No,Local Physician


Referring Physician





Date of Admission


Dec 1, 2021 at 07:15





Home Medications & Allergies


Home Medications


Reviewed patient Home Medication Reconciliation performed by pharmacy medication

reconciliations technician and/or nursing.


Patients Allergies have been reviewed.





Allergies





Allergies


Coded Allergies


  lurasidone (Verified Allergy, Unknown, "wanted to kill people", 21)


  propranolol (Verified Allergy, Unknown, swollen eyes and dizziness, 21)


Uncoded Allergies


  LIBORIO AND LIBORIO COVID VACCINE ( Allergy, Unknown, FACIAL SWELLING, 

21)








Past Medical-Social-Family Hx


Patient Social History


Marrital Status:  


Tobacco Use?:  No


Smoking Status:  Never a Smoker


Smokeless Tobacco Frequency:  Never a User


Use of E-Cig and/or Vaping Galdino:  Never a User


Alcohol Use?:  No


Pt feels they are or have been:  No





Immunizations Up To Date


Date of Influenza Vaccine:  2021


First/Initial COVID19 Vaccinat:  21





Seasonal Allergies


Seasonal Allergies:  No





Current Status


Pregnancy status:  No


Advance Directives:  No


Communicates:  Verbally


Primary Language:  English


Preferred Spoken Language:  English


Is interpretation needed?:  No


Implanted or Applied Medical D:  Orthopedic hardware





Past Medical History


Surgeries:   Section (x3), Orthopedic (total knee arthroplasty, left 

knee)


Currently Using CPAP:  No


Currently Using BIPAP:  No


Seizure Disorder


Arthritis


Hypothyroidsim (post radiation therapy for hyperthyroidism)


Sleep Difficulties, Anxiety, Bipolar, Depression


Blood Disorders:  No





Review of Systems


Constitutional:  No chills, No fever


EENTM:  no symptoms reported


Respiratory:  No cough, No short of breath


Cardiovascular:  No chest pain, No palpitations


Gastrointestinal:  No abdominal pain, No nausea, No vomiting


Genitourinary:  no symptoms reported


Musculoskeletal:  joint pain (Right knee)


Skin:  other (patient concered about a 1 cm x 1 cm black spot on her back. 

States it has been there for 5 years and has grown in that time. States it is s

ometimes painful. )





Physical Exam


Physical Exam


Vital Signs





Vital Signs - First Documented








 21





 07:40


 


Temp 36.3


 


Pulse 83


 


Resp 20


 


B/P (MAP) 109/78 (88)


 


Pulse Ox 94


 


O2 Delivery Room Air





Capillary Refill : Less Than 3 Seconds


Height, Weight, BMI


Height: '"


Weight: lbs. oz. kg; 31.70 BMI


Method:


General Appearance:  No Apparent Distress, WD/WN


HEENT:  PERRL/EOMI, Moist Mucous Membranes


Neck:  Non Tender, Supple


Respiratory:  Chest Non Tender, Lungs Clear, Normal Breath Sounds, No Accessory 

Muscle Use, No Respiratory Distress


Cardiovascular:  Regular Rate, Rhythm, No Murmur


Gastrointestinal:  Normal Bowel Sounds, Non Tender, Soft


Rectal:  Deferred


Extremity:  Normal Capillary Refill, Non Tender


Neurologic/Psychiatric:  Alert, Oriented x3, Normal Mood/Affect


Skin:  Normal Color, Warm/Dry





Results


Results/Procedures


Labs


Patient resulted labs reviewed.





Assessment/Plan


Assessment and Plan


Assess & Plan/Chief Complaint


Assessment


Status-post right knee total arthroplasty 


Hypothyroidism 


Bipolar disorder


PTSD


Depression 








Plan


Pain management 


Stool softener 


Continue compression devices


Incentive spirometer 


Consider zolpidem if pt unable to sleep


Continue home meds





KAREN GROVES DO 21 0553:


HPI


History of Present Illness:


HPI/Chief Complaint


CC: Right knee replacement





HPI: This is a 56[facility]WF clinic patient of [facility] who has a h/o mental illness who 

presents after an uncomplicated RTKR. Patient is doing well and has no com

plaints.


Source:  patient


Exam Limitations:  no limitations





Past Medical-Social-Family Hx


Patient Social History


Marrital Status:  


Employed/Student:  unemployed


Smoking Status:  Former Smoker





Past Medical History


Bipolar, Depression





Review of Systems


Constitutional:  see HPI


EENTM:  no symptoms reported


Respiratory:  no symptoms reported


Cardiovascular:  no symptoms reported


Gastrointestinal:  no symptoms reported





Physical Exam


Physical Exam


General Appearance:  No Apparent Distress, WD/WN


Eyes:  Bilateral Eye Normal Inspection, Bilateral Eye PERRL


HEENT:  PERRL/EOMI, Normal ENT Inspection, Pharynx Normal


Neck:  Full Range of Motion, Normal Inspection, Non Tender, Supple, Carotid 

Bruit


Respiratory:  Chest Non Tender, Lungs Clear, Normal Breath Sounds, No Accessory 

Muscle Use, No Respiratory Distress


Cardiovascular:  Regular Rate, Rhythm, No Edema, No Gallop, No JVD, No Murmur, 

Normal Peripheral Pulses


Gastrointestinal:  Normal Bowel Sounds, No Organomegaly, No Pulsatile Mass, Non 

Tender, Soft


Back:  Normal Inspection, No CVA Tenderness, No Vertebral Tenderness


Extremity:  Normal Capillary Refill, Normal Inspection, Normal Range of Motion 

(excep right leg in CPM), Non Tender, No Calf Tenderness, No Pedal Edema


Neurologic/Psychiatric:  Alert, Oriented x3, No Motor/Sensory Deficits, Normal 

Mood/Affect


Skin:  Normal Color, Warm/Dry


Lymphatic:  No Adenopathy





Assessment/Plan


Assessment and Plan


Assess & Plan/Chief Complaint


Assessment:


Right TKR


Bipolar


Hypothyroidism





Plan:


Pain control





Supervisory-Addendum Brief


Verification & Attestation


Participated in pt care:  history, MDM, physical


Personally performed:  exam, history, MDM, supervision of care


Care discussed with:  Medical Student


Procedures:  n/a


Results interpretation:  Verified all documentation


Verification and Attestation of Medical Student E/M Service





A medical student performed and documented this service in my presence. I 

reviewed and verified all information documented by the medical student and made

modifications to such information, when appropriate. I personally performed the 

physical exam and medical decision making. 





 Karen Groves Dec 2, 2021,05:53











KASHIFKELLY                  Dec 1, 2021 14:52


KAREN GROVES DO                 Dec 2, 2021 05:53

## 2021-12-01 NOTE — XMS REPORT
Clinical Summary

                             Created on: 2021



Riri Patino

External Reference #: [id number]

: 1965

Sex: Female



Demographics





                          Address                   [address] [place]  [address]

 

                          Home Phone                [phone]

 

                          Preferred Language        English

 

                          Marital Status            

 

                          Yarsani Affiliation     Unknown

 

                          Race                      White

 

                          Ethnic Group              Not  or 





Author





                          Author                    [facility]

 

                          Organization              [facility]

 

                          Address                   Unknown

 

                          Phone                     Unavailable







Support





                Name            Relationship    Address         Phone

 

                No Contact      [employer]            Unknown         [phone]







Care Team Providers





                    Care Team Member Name Role                Phone

 

                          PCP                       Unavailable







Allergies

Not on File



Medications

Not on file



Active Problems





Not on file



Social History





                                        Date



                 Tobacco Use     Types           Packs/Day       Years Used 

 

                                         



                                         Never Assessed    







 



                           Sex Assigned at Birth     Date Recorded

 

 



                                         Not on file 







Last Filed Vital Signs

Not on file



Plan of Treatment





   



                 Health Maintenance  Due Date        Last Done       Comments

 

   



                           Hepatitis C Screen        1965  

 

   



                           Td/Tdap#                  1965  

 

   



                           COVID-19 Vaccine (1)      1970  

 

   



                           Cervical Cancer Screening  1986  



                                         via Pap Smear   

 

   



                           Colorectal Screening via  2015  



                                         Colonoscopy   

 

   



                           Mammogram Screening       2015  

 

   



                           Zoster Vaccine# (1 of 2)  2015  

 

   



                     Influenza Vaccine (#1)  10/01/2021          10/25/2019, 



                                         10/11/2018 

 

   



                     Pneumococcal Vaccine:  Aged Out            No longer eligib

le based on patient's age to



                           Pediatrics (0 to 5 Years)    complete this topic



                                         and At-Risk Patients (6   



                                         to 64 Years)   







Results

Not on filefrom Last 3 Months



Insurance





                                        Type



            Payer      Benefit    Subscriber ID  Effective  Phone      Address 



                           Plan /                    Dates   



                                         Group     

 

                                         



            WORKERS COMPENSATION  MISC WORK  [id number]  2013  [phone]

[address] 



                     COMP                -Present            [facility] 



                                         [address] 



                                         [place] 



                                         [address] 







     



            Guarantor Name  Account    Relation to  Date of    Phone      Billin

g Address



                     Type                Patient             Birth  

 

     



              Riri Patino  Workers      Self         1965   [address] [place] [address]







Advance Directives





For more information, please contact: [phone]







                          Patient Representative    Explanation



                           Type                      Date Recorded  

 

                                                     



                                         Health Care   



                                         Directive

## 2021-12-01 NOTE — XMS REPORT
MU2 Ambulatory Summary

                             Created on: 2021



Riri Patino

External Reference #: [id number]

: 1965

Sex: Female



Demographics





                          Address                   [address]  [address]

 

                          Home Phone                [phone]

 

                          Preferred Language        English

 

                          Marital Status            

 

                          Anabaptism Affiliation     Unknown

 

                          Race                      White

 

                          Ethnic Group              Not  or 





Author





                          Riri Herbert

 

                          Organization              [facility] 

oup

 

                          Address                   [address]

[place]  [id number]



 

                          Phone                     [phone]







Care Team Providers





                    Care Team Member Name Role                Phone

 

                    Bárbara Granger   PCP                 [phone]

 

                    Marti Hagen PreferredProvider   [phone]







Allergies and Adverse Reactions





                    Name                Reaction            Notes

 

                    NO KNOWN DRUG ALLERGIES                      







Plan of Treatment

Not available.



Medications





                                        Active 

 

             Name         Start Date   Estimated Completion Date SIG          Co

mments

 

                levothyroxine 150 mcg oral capsule                              

   take 1 capsule (150 mcg) by oral route 

once daily                               

 

                Ambien 5 mg oral tablet                                 take 1 t

ablet (5 mg) by oral route once daily at 

bedtime                                  







                                         

 

             Name         Start Date   Expiration Date SIG          Comments

 

                Adderall 20 mg oral tablet 2010        take 

1 tablet (20 mg) by oral route

once daily before breakfast for 30 days  

 

                Viibryd 40 mg oral tablet 2012        10/28/2012      take 1

 tablet (40 mg) by oral 

route once daily with food for 30 days   

 

                Claritin 10 mg oral tablet 2012      take 

1 tablet (10 mg) by oral 

route once daily for 30 days             

 

                terbinafine HCl 250 mg oral tablet 2012    

   take 1 tablet (250 mg) by 

oral route once daily for 30 days        

 

             Estrogens Conjugated 0.5 mg vaginally 2013   0.5

 mg twice weekly  

 

                amoxicillin 500 mg oral capsule 2016       

take 2 capsules by oral route

2 times a day for 10 days                

 

                Zithromax 250 mg oral tablet 2017       aida

e 2 tablets (500 mg) by oral

route once daily for 1 day then 1 tablet (250 mg) by oral route once daily for 4
days                                     

 

             Adderall 15 mg oral tablet 2017     1 tab am and 1

 tab noon  for ADD  

 

                Nature-Throid 81.25 mg oral tablet 2018                     

   take 1.5 tablets by oral route 2 

times a day for 90 days                  







                                        Discontinued 

 

             Name         Start Date   Discontinued Date SIG          Comments

 

             Ritalin 10 mg oral tablet              2010     take 1 tablet b

y oral route daily  

 

                Xanax 0.25 mg oral tablet 2016       take 1

 tablet by oral route 2 

times a day as needed                    

 

                Lexapro 10 mg oral tablet 2017        take 1

 tablet (10 mg) by oral route

once daily for 30 days                   







Problem List





                    Description         Status              Onset

 

                    Allergic rhinitis; due to other allergen Active             

  

 

                    Hypothyroidism, unspecified type Active              20

16







Vital Signs





     Date Time BP-Sys(mm[Hg] BP-Roro(mm[Hg]) HR(bpm) RR(rpm) Temp WT   HT   HC   

BMI  BSA  BMI

 Percentile                             O2 Sat(%)

 

        2021 3:58:00  mm[Hg] 80 mm[Hg] 97 {beats}/min 18 rpm  97.9 F 

 180 lbs 

63 in                     31.8852 kg/m2 1.905 m2                  96 %

 

     2020 10:41:00 AM           88 {beats}/min      99.7 F                 

              97 %

 

        2017 11:11:00  mm[Hg] 102 mm[Hg] 84 {beats}/min 22 rpm  98.4 

F  167.5 

lbs        63 in                 29.671 kg/m2 1.8377 m2             97 %

 

        2017 11:05:00  mm[Hg] 80 mm[Hg] 104 {beats}/min 18 rpm  99.1 

F  174.125

 lbs       63 in                 30.84 kg/m2 1.87 m2               97 %

 

        2017 1:08:00  mm[Hg] 90 mm[Hg] 112 {beats}/min 18 rpm  99.8 F 

 180.25 

lbs        63 in                 31.93 kg/m2 1.91 m2               97 %

 

        2017 2:50:00  mm[Hg] 98 mm[Hg] 100 {beats}/min 20 rpm  99.2 F

  168.375 

lbs        63 in                 29.826 kg/m2 1.8425 m2             99 %

 

      2016 3:19:00  mm[Hg] 100 mm[Hg] 101 {beats}/min 16 rpm 97.9 F   

                      

                                                    99 %

 

        2013 8:34:00  mm[Hg] 83 mm[Hg] 100 {beats}/min         98.1 F

  169.125 lbs 

63 in                     29.96 kg/m2  1.85 m2                    

 

        2013 9:11:00  mm[Hg] 88 mm[Hg] 109 {beats}/min         97.8 F

  165.375 lbs 

63 in                     29.2946 kg/m2 1.826 m2                   

 

        2012 10:34:00  mm[Hg] 66 mm[Hg] 66 {beats}/min 18 rpm  98.1 F

  172.125 

lbs        63 in                 30.49 kg/m2 1.86 m2                

 

        2012 4:35:00  mm[Hg] 66 mm[Hg] 68 {beats}/min 18 rpm  98.9 F  

175 lbs 63

 in                       30.9995 kg/m2 1.8784 m2                  

 

        2012 8:26:00  mm[Hg] 76 mm[Hg] 68 {beats}/min 18 rpm  97.4 F  

169.5 lbs 

63 in                     30.03 kg/m2  1.85 m2                    

 

     2010 9:22:00  mm[Hg] 80 mm[Hg]                180 lbs            

               

 

        2010 9:17:00  mm[Hg] 72 mm[Hg] 64 {beats}/min 16 rpm  98 F    

183.25 lbs  

                                                                  

 

     2010 12:01:00  mm[Hg] 70 mm[Hg]           97.4 F 189 lbs         

                  







Social History





                    Name                Description         Comments

 

                    Alcohol Use - Rare                       

 

                    Tobacco             Never smoker         







History of Procedures





                    Date Ordered        Description         Order Status

 

                    2012 12:00 AM  URINALYSIS AUTO W/O SCOPE Reviewed

 

                    2017 12:00 AM  THER/PROPH/DIAG INJ SC/IM Reviewed

 

                    2017 12:00 AM  Decadron 4mg Injection Reviewed

 

                    2017 12:00 AM  Depo-Medrol 40mg Injection Reviewed

 

                    2017 12:00 AM  Toradol 60 Mg Injection Reviewed

 

                    2017 12:00 AM  THER/PROPH/DIAG INJ SC/IM Reviewed

 

                    2020 12:00 AM  COVID-19 Testing    Returned

 

                    2021 12:00 AM  THER/PROPH/DIAG INJ SC/IM Reviewed







Results Summary

Not available.



History Of Immunizations

Not available.



History of Past Illness





                    Name                Date of Onset       Comments

 

                    Allergic rhinitis; due to other allergen                    

  

 

                    Attention Deficit Disorder 2010 12:03PM  

 

                    Seasonal Allergies  2010 12:03PM  

 

                    Anxiety Disorder    2010  9:23AM  

 

                    Attention Deficit Disorder 2010  9:23AM  

 

                    Anxiety Disorder    May  5 2010  9:21AM  

 

                    Attention Deficit Disorder May  5 2010  9:21AM  

 

                    Hypothyroidism, unspecified type 2016           

 

                    Anemia                                   

 

                    Anxiety                                  

 

                    Depression                               

 

                    Fractured bone                           

 

                    Headache                                 

 

                    GERD                                     

 

                    Insomnia                                 

 

                    Kidney stones                            

 

                    Kidney infection                         

 

                    Weight Change                            

 

                    Depression          2012  8:28AM  

 

                    Depression          May  1 2012  4:37PM  

 

                    Onychomycosis Of Toenail May  1 2012  4:37PM  

 

                    Hematuria           May 21 2012 10:36AM  

 

                    Menopausal Syndrome 2013  9:20AM  

 

                    Dyspareunia         2013  9:20AM  

 

                    Irregular Menstrual Cycle 2013  9:20AM  

 

                    Hormone Replacement Therapy Aug 26 2013  8:39AM  

 

                    Hypothyroidism, unspecified type Aug  8 2016  3:22PM  

 

                                        Acute suppurative otitis media of left e

ar without spontaneous rupture of 

tympanic membrane, recurrence not specified Aug  8 2016  3:22PM        

 

                    Anxiety             Aug  8 2016  3:22PM  

 

                    Divorce             Aug  8 2016  3:22PM  

 

                    Cough               2017  2:53PM  

 

                    Ruptured eardrum, left 2017  2:53PM  

 

                                        Acute suppurative otitis media of left e

ar with spontaneous rupture of tympanic 

membrane, recurrence not specified 2017  2:53PM        

 

                    Reactive airway disease that is not asthma 2017  2:53

PM  

 

                    Depression          2017  2:53PM  

 

                    Hypothyroidism, unspecified type May 12 2017  2:50PM  

 

                          ADHD (attention deficit hyperactivity disorder), combi

aleksandra type 2017  

1:09PM                                   

 

                    Stress headache     2017 11:08AM  

 

                    Depression          2017 11:14AM  

 

                    Suicidal behavior with attempted self-injury 2017 11:

14AM  

 

                    Post concussion syndrome 2017 11:14AM  

 

                    Diarrhea            Aug 20 2020 10:44AM  

 

                    Loss of taste       Aug 20 2020 10:44AM  

 

                    Loss of smell       Aug 20 2020 10:44AM  

 

                    Allergic reaction to vaccine 2021  4:01PM  







Payers





           Insurance Name Company Name Plan Name  Plan Number Policy Number Vicente

cy Group 

Number                                  Start Date

 

                    Medicare RHC Medicare RHC           [id number]           N/

A

 

                    Medicare Part A Medicare - Lab/Xray           [id number]   

        N/A

 

                    [employer]      [employer] [place]           [id number]           [place]

nday, 2010

 

                    [employer]      [employer] [place]           [id number]           [place]

nd, 2012

 

                    [employer]BS      [employer] [place]           [id number]           N/

A







History of Encounters





                    Visit Date          Visit Type          Provider

 

                    2021           Office visit        Bárbara BERNARD

RN

 

                    2020           Office visit        Saida BERNARD

RN

 

                    2017           Office visit        Marti PIKE

PRN

 

                    2017           Office visit        Marti PIKE

PRN

 

                    2017            Office visit        Marti PIKE

PRN

 

                    2017           Office visit        Marti PIKE

PRN

 

                    2016            Office visit        Saman Patterson APR

N

 

                    2013           Office visit        Kinjal torres APRN

 

                    2013           Office visit        Kinjal torres APRN

 

                    2012           Office visit        Mirna Santos APRN

 

                    2012            Office visit        Mirna Santos APRN

 

                    2012            Office visit        Mirna Santos APRN

 

                    2010            Office visit        Prosper Hair DO

 

                    2010            Office visit        Prosper Hair DO

 

                    2010            Office visit        Prosper Hair DO

## 2021-12-01 NOTE — PROGRESS NOTE-PRE OPERATIVE
Pre-Operative Progress Note


H&P Reviewed


The H&P was reviewed, patient examined and no changes noted.


Date Seen by Provider:  Dec 1, 2021


Time Seen by Provider:  07:31


Date H&P Reviewed:  Dec 1, 2021


Time H&P Reviewed:  07:11


Pre-Operative Diagnosis:  right knee primary osteoarthritis











STONEY GASPAR MD             Dec 1, 2021 07:42

## 2021-12-01 NOTE — PHYSICAL THERAPY EVALUATION
PT Evaluation-General


Medical Diagnosis


Admission Date


Dec 1, 2021 at 07:15


Medical Diagnosis:  right TKA


Onset Date:  Dec 1, 2021





Therapy Diagnosis


Therapy Diagnosis:  impaired mobility, strength, endurance, ROM





Weight Bear Status


Right Lower Extremity:  Right


Weight Bearing/Tolerated





Referral


Physician:  Jassi


Reason for Referral:  Evaluation/Treatment





Medical History


Additional Medical History


PAST MEDICAL HISTORY:


Back pain, constipation, reflux, allergic rhinitis, depression, hypothyroidism,


bipolar disorder and seizure disorder.





PAST SURGICAL HISTORY:


Left shoulder arthroscopy, , tubal ligation, left wrist internal


fixation and left total knee arthroplasty.


Reviewed History:  Yes





Social History


Home:  Single Level


Entry Into Home:  Stairs Without Railing


PT Steps Into Home:  3





Prior


Prior Level of Function


SCALE: Activities may be completed with or without assistive devices.





6-Indepedent-patient completes the activity by him/herself with no assistance 

from a helper.


5-Set-up or Clean-up Assistance-helper sets up or cleans up; patient completes 

activity. [place] assists only prior to or  


    following the activity.


4-Supervision or Touching Assistance-helper provides verbal cues and/or 

touching/steadying and/or contact guard assistance as patient completes 

activity. Assistance may be provided   


    throughout the activity or intermittently.


3-Partial/Moderate Assistance-helper does LESS THAN HALF the effort. [place] 

lifts, holds or supports trunk or limbs, but provides less than half the effort.


2-Substantial/Maximal Assistance-helper does MORE THAN HALF the effort. [place] 

lifts or holds trunk or limbs and provides more than half the effort.


6-Fddzeyonv-tnlmam does ALL the effort. Patient does none of the effort to 

complete the activity. Or, the assistance of 2 or more helpers is required for 

the patient to complete the  


    activity.


If activity was not attempted, code reason:


7-Patient Refused.


9-Not Applicable-not attempted and the patient did not perform the activity 

before the current illness, exacerbation or injury.


10-Not Attempted due to Environmental Limitations-(lack of equipment, weather 

restraints, etc.).


88-Not Attempted due to Medical Conditions or Safety Concerns.


Bed Mobility:  6


Transfers (B,C,W/C):  6


Gait:  6


Stairs:  6


Indoor Mobility (Ambulation):  Independent


Stairs:  Independent





PT Evaluation-Current


Subjective


Patient in bed pre tx, agrees to PT, has no complaints of pain





Pt/Family Goals


to be independent at home





Objective


Patient Orientation:  Person, Place, Situation


Attachments:  Oxygen, IV





ROM/Strength


ROM Lower Extremities


right knee flexion 50 degrees, extension +2 degrees





Sensory


Hearing:  Functional


Sensation Right Lower Extremit:  Impaired (patient's leg is still numb below the

knee)


Sensation Left Lower Extremity:  Intact





Transfers


Roll Left to Right (QC):  6


Sit to Lying (QC):  4


Lying to Sitting/Side of Bed(Q:  4


Sit to Stand (QC):  4


Chair/Bed-to-Chair Xfer(QC):  4


Toilet Transfer (QC):  4





Gait


Does the Patient Walk?:  Yes


Mode of Locomotion:  Walk


Anticipated Mode of Locomotion:  Walk


Walk 10 feet (QC):  4


Walk 50 ft with 2 Turns(QC):  4


Distance:  50', 20'


Gait Assistive Device:  FWW


Comments/Gait Description


Patient ambulates into the hallway and then to her restroom, then back to bed.  

Patient ambulates slow but steady, good step through but painful





Balance


Sitting Static:  Normal


Sitting Dynamic:  Normal


Standing Static:  Good


 Standing Dynamic:  Good





Treatment


supine RTKA protocol x10 (AP, QS, HS, SAQ, SLR), CPM donned and set to 50/-2 

degrees





Assessment/Needs


Patient in bed post tx with nurse call, phone, tray, all needs met.  Patient has

impaired mobility, ,strength, endurance, ROM.  Patient's leg is till numb below 

the knee an she doesn't have any voluntary dorsiflexion


Rehab Potential:  Fair





PT Long Term Goals


Long Term Goals


PT Long Term Goals Time Frame:  Dec 8, 2021


Roll Left & Right (QC):  6


Sit to Lying (QC):  6


Lying-Sitting on Side/Bed(QC):  6


Sit to Stand (QC):  6


Chair/Bed-to-Chair Xfer(QC):  6


Walk 10 feet (QC):  6


Walk 50ft with 2 Turns (QC):  6


Walk 150 ft (QC):  6





PT Plan


Problem List


Problem List:  Activity Tolerance, Functional Strength, Safety, Balance, Gait, 

Transfer, Bed Mobility, ROM





Treatment/Plan


Treatment Plan:  Continue Plan of Care


Treatment Plan:  Bed Mobility, Education, Functional Activity Krupa, Functional 

Strength, Gait, Safety, Therapeutic Exercise, Transfers


Treatment Duration:  Dec 8, 2021


Frequency:  11 times per week


Estimated Hrs Per Day:  .25 hour per day


Patient and/or Family Agrees t:  Yes





Safety Risks/Education


Patient Education:  Gait Training, Transfer Techniques, Correct Positioning, 

Safety Issues


Teaching Recipient:  Patient


Teaching Methods:  Demonstration, Discussion


Response to Teaching:  Reinforcement Needed





Discharge Recommendations


Plan


Patient will perform bed mobility and transfer training, balance and endurance 

training, functional strengthening, stair training, gait training, and 

education, to improve functional mobility and independence at home.


Therapy Discharge Recommendati:  Home & Family, Post Acute PT





Time/GCodes


Time In:  1503


Time Out:  1530


Total Billed Treatment Time:  27


Total Billed Treatment


1 visit


EVL 10'


FA 17'











TEAGAN GORE PT                 Dec 1, 2021 15:41

## 2021-12-01 NOTE — PROGRESS NOTE
Standard Progress Note


Progress Notes/Assess & Plan


Date Seen by a Provider:  Dec 1, 2021


Time Seen by a Provider:  11:41


Progress/Assessment & Plan


post op check 


no complaints


radiographs--HW well positioned without fracture


RLE--2 plus DP pulse with brisck cap refill


s/p R TKA 


mobilize as able











STONEY GASPAR MD             Dec 1, 2021 12:13

## 2021-12-01 NOTE — OPERATIVE REPORT
DATE OF SERVICE:  12/01/2021



PREOPERATIVE DIAGNOSIS:

Right knee primary osteoarthritis.



POSTOPERATIVE DIAGNOSIS:

Right knee primary osteoarthritis.



PROCEDURE:

Right total knee arthroplasty.



SURGEON:

Artis Gaspar MD



ASSISTANT:

Paramjit Keene, who assisted throughout the procedure and closed the incision.



ANESTHESIA:

General endotracheal by Yun Zepeda CRNA.



TOURNIQUET TIME:

Approximately 60 minutes at 300 mmHg.



ESTIMATED BLOOD LOSS:

Minimal.



DRAINS:

None.



COMPLICATIONS:

None.



POSTOPERATIVE PLAN:

Routine protocol.  The patient was transferred to the recovery room awake and

stable condition.



MATERIALS:

Microport cemented size 3 femur, cemented size 3 tibia with 10 mm insert and a

cemented 32 patellar button.



STATEMENT OF MEDICAL NECESSITY:

The patient is a 56-year-old female with progressively worsening right knee

pain.  Radiographs revealed severe medial and patellofemoral arthrosis.  She has

undergone treatment with injections, anti-inflammatories and rest without

relief.  Due to functional impairment and failure to improve with conservative

measures, the patient elected to proceed with surgical intervention.



DESCRIPTION OF PROCEDURE:

After risks and benefits of procedure were discussed and questions were

answered, an informed consent was signed and placed on the chart.  The operative

site was confirmed in the preoperative holding area and initialed by the

surgeon.  The patient was then transferred to the operating room and after

adequate levels of general endotracheal anesthetic were obtained, a timeout was

called, confirming the operative site.  The right lower extremity was prepped

and draped in the usual sterile fashion with the leg elevated and the knee

flexed, the tourniquet was inflated to 300 mmHg.  Standard anterior approach was

utilized.  Hemostasis was obtained with cautery.  Medial parapatellar arthrotomy

was performed leaving 1 cm cuff on the patella for later reattachment.  A

portion of the fat pad was resected.  A subperiosteal release was performed on

the proximal medial tibia being careful to stay on the bony surface.  The ACL

was resected.  The intramedullary guide was passed into the femoral canal.  The

distal cutting block was placed.  Distal cut was made, the femur sized to a size

3.  The 3 cutting block was placed parallel to the epicondylar axis and the cuts

were made from posterior to anterior.  A subperiosteal release was then

carefully performed on posterior distal femur, being careful to stay on the bony

surface.  Intramedullary guide was then passed into the tibia.  The cutting

block was placed.  The drop damaso transected the intermalleolar axis.  The cut was

made.  The baseplate was placed and again, the drop damaso transected the

intermalleolar axis.  This was then prepared with the drill and keel punch.  A

10 mm insert was placed.  The femoral trial was placed and trochlear cut was

made.  The patella was then prepared by resecting 10 mm off the undersurface

using the freehand technique.  The peg guide was placed and peg holes were

drilled.  The 32 trial was placed.  The knee was taken through range of motion. 

Full extension was easily obtained.  There was no anterior/posterior or

medial/lateral laxity in flexion or extension.  The patella tracked well.  The

trials were removed.  The bone ends were irrigated with pulse lavage. 

Periarticular block without Toradol was placed in the posterior capsule, medial

and lateral retinaculum, extensor mechanism, and subcutaneous tissues.  The bone

ends were further irrigated and dried.  The tibial baseplate was cemented into

position.  Excessive cement was removed, the superior surface was irrigated and

dried and the polyethylene insert was placed.  Distal femur was irrigated and

dried and the femoral prosthesis was cemented into position.  Excessive cement

was removed.  The knee was brought out into full extension until cement had

cured.  The undersurface of the patella was irrigated and dried and the patellar

button was cemented into position.  Excessive cement was removed.  Once the

cement had cured, the knee was taken through a range of motion.  Full extension

was easily obtained, 120 degrees of flexion with gravity was easily obtained. 

The patella tracked well.  There was no anterior/posterior or medial/lateral

laxity in flexion or extension.  The joint was further irrigated with pulse

lavage.  Arthrotomy was closed with #2 Tevdek in figure-of-eight interrupted

fashion.  Knee was flexed and the repair was stable.  Subcutaneous tissues were

irrigated using a total of 6 liters throughout the procedure.  A 0 Vicryl was

used for deep subcutaneous layer, 2-0 Vicryl for the superficial subcutaneous

layer, staples used on the skin.  A soft dressing was applied.  The tourniquet

was deflated.  The patient was transferred to the recovery room awake and in

stable condition.





Job ID: [id number]

DocumentID: [id number]

Dictated Date:  12/01/2021 11:01:57

Transcription Date: 12/01/2021 16:55:39

Dictated By: ARTIS GASPAR MD

## 2021-12-01 NOTE — DIAGNOSTIC IMAGING REPORT
HISTORY: 

Postop right knee arthroplasty.



TECHNIQUE: 

Two views of the right knee.



COMPARISON: 

None.



FINDINGS:

There is a right total knee arthroplasty. Alignment is normal. No

immediate hardware complication is seen. There is intra-articular

air and fluid. There is surrounding soft tissue edema and air.

Skin staples are seen anteriorly.



IMPRESSION:

Post surgical changes from right knee arthroplasty with no

immediate hardware complication seen.



Dictated by: 



  Dictated on workstation # [id number]

## 2021-12-01 NOTE — PROGRESS NOTE-POST OPERATIVE
Post-Operative Progess Note


Surgeon (s)/Assistant (s)


Surgeon


STONEY GASPAR MD


Assistant:  Paramjit Keene





Pre-Operative Diagnosis


right knee primary osteoarthritis





Post-Operative Diagnosis





right knee primary osteoarthritis





Procedure & Operative Findings


Date of Procedure


12/1/21


Procedure Performed/Findings


right total knee arthroplasty


Anesthesia Type


GETA





Estimated Blood Loss


Estimated blood loss (mL):  minimal





Specimens/Packing


Specimens Removed


none


Packing:  


none











STONEY GASPAR MD             Dec 1, 2021 07:43

## 2021-12-02 VITALS — SYSTOLIC BLOOD PRESSURE: 138 MMHG | DIASTOLIC BLOOD PRESSURE: 74 MMHG

## 2021-12-02 VITALS — SYSTOLIC BLOOD PRESSURE: 129 MMHG | DIASTOLIC BLOOD PRESSURE: 82 MMHG

## 2021-12-02 VITALS — SYSTOLIC BLOOD PRESSURE: 122 MMHG | DIASTOLIC BLOOD PRESSURE: 91 MMHG

## 2021-12-02 VITALS — DIASTOLIC BLOOD PRESSURE: 86 MMHG | SYSTOLIC BLOOD PRESSURE: 144 MMHG

## 2021-12-02 VITALS — SYSTOLIC BLOOD PRESSURE: 118 MMHG | DIASTOLIC BLOOD PRESSURE: 73 MMHG

## 2021-12-02 VITALS — SYSTOLIC BLOOD PRESSURE: 113 MMHG | DIASTOLIC BLOOD PRESSURE: 76 MMHG

## 2021-12-02 LAB
ALBUMIN SERPL-MCNC: 3.8 GM/DL (ref 3.2–4.5)
ALP SERPL-CCNC: 65 U/L (ref 40–136)
ALT SERPL-CCNC: 17 U/L (ref 0–55)
BASOPHILS # BLD AUTO: 0 10^3/UL (ref 0–0.1)
BASOPHILS NFR BLD AUTO: 0 % (ref 0–10)
BILIRUB SERPL-MCNC: 0.3 MG/DL (ref 0.1–1)
BUN/CREAT SERPL: 14
CALCIUM SERPL-MCNC: 8.6 MG/DL (ref 8.5–10.1)
CHLORIDE SERPL-SCNC: 103 MMOL/L (ref 98–107)
CO2 SERPL-SCNC: 24 MMOL/L (ref 21–32)
CREAT SERPL-MCNC: 1 MG/DL (ref 0.6–1.3)
EOSINOPHIL # BLD AUTO: 0 10^3/UL (ref 0–0.3)
EOSINOPHIL NFR BLD AUTO: 0 % (ref 0–10)
GFR SERPLBLD BASED ON 1.73 SQ M-ARVRAT: 57 ML/MIN
GLUCOSE SERPL-MCNC: 96 MG/DL (ref 70–105)
HCT VFR BLD CALC: 33 % (ref 35–52)
HGB BLD-MCNC: 10.6 G/DL (ref 11.5–16)
LYMPHOCYTES # BLD AUTO: 0.8 10^3/UL (ref 1–4)
LYMPHOCYTES NFR BLD AUTO: 9 % (ref 12–44)
MANUAL DIFFERENTIAL PERFORMED BLD QL: NO
MCH RBC QN AUTO: 28 PG (ref 25–34)
MCHC RBC AUTO-ENTMCNC: 32 G/DL (ref 32–36)
MCV RBC AUTO: 88 FL (ref 80–99)
MONOCYTES # BLD AUTO: 0.9 10^3/UL (ref 0–1)
MONOCYTES NFR BLD AUTO: 10 % (ref 0–12)
NEUTROPHILS # BLD AUTO: 7.3 10^3/UL (ref 1.8–7.8)
NEUTROPHILS NFR BLD AUTO: 81 % (ref 42–75)
PLATELET # BLD: 244 10^3/UL (ref 130–400)
PMV BLD AUTO: 10.5 FL (ref 9–12.2)
POTASSIUM SERPL-SCNC: 4.3 MMOL/L (ref 3.6–5)
PROT SERPL-MCNC: 6.6 GM/DL (ref 6.4–8.2)
SODIUM SERPL-SCNC: 136 MMOL/L (ref 135–145)
WBC # BLD AUTO: 9 10^3/UL (ref 4.3–11)

## 2021-12-02 RX ADMIN — OXYCODONE HYDROCHLORIDE AND ACETAMINOPHEN PRN TAB: 5; 325 TABLET ORAL at 20:14

## 2021-12-02 RX ADMIN — OXYCODONE HYDROCHLORIDE AND ACETAMINOPHEN PRN TAB: 5; 325 TABLET ORAL at 03:43

## 2021-12-02 RX ADMIN — LEVOTHYROXINE SODIUM SCH MCG: 75 TABLET ORAL at 06:14

## 2021-12-02 RX ADMIN — DOCUSATE SODIUM AND SENNOSIDES SCH EA: 8.6; 5 TABLET, FILM COATED ORAL at 08:04

## 2021-12-02 RX ADMIN — OXYCODONE HYDROCHLORIDE AND ACETAMINOPHEN PRN TAB: 5; 325 TABLET ORAL at 13:02

## 2021-12-02 RX ADMIN — LEVOTHYROXINE SODIUM SCH MCG: 100 TABLET ORAL at 06:14

## 2021-12-02 RX ADMIN — SODIUM CHLORIDE SCH MLS/HR: 900 INJECTION, SOLUTION INTRAVENOUS at 15:45

## 2021-12-02 RX ADMIN — OXYCODONE HYDROCHLORIDE AND ACETAMINOPHEN PRN TAB: 5; 325 TABLET ORAL at 08:53

## 2021-12-02 RX ADMIN — CEFUROXIME SCH MLS/HR: 750 INJECTION, POWDER, FOR SOLUTION INTRAMUSCULAR; INTRAVENOUS at 00:00

## 2021-12-02 RX ADMIN — Medication SCH EA: at 06:14

## 2021-12-02 RX ADMIN — ENOXAPARIN SODIUM SCH MG: 30 INJECTION SUBCUTANEOUS at 17:51

## 2021-12-02 RX ADMIN — OXYCODONE HYDROCHLORIDE AND ACETAMINOPHEN PRN TAB: 5; 325 TABLET ORAL at 15:31

## 2021-12-02 RX ADMIN — SODIUM CHLORIDE SCH MLS/HR: 900 INJECTION, SOLUTION INTRAVENOUS at 08:04

## 2021-12-02 RX ADMIN — ASPIRIN SCH MG: 81 TABLET ORAL at 08:04

## 2021-12-02 RX ADMIN — DOCUSATE SODIUM AND SENNOSIDES SCH EA: 8.6; 5 TABLET, FILM COATED ORAL at 17:53

## 2021-12-02 RX ADMIN — ENOXAPARIN SODIUM SCH MG: 30 INJECTION SUBCUTANEOUS at 06:14

## 2021-12-02 RX ADMIN — OXYCODONE HYDROCHLORIDE AND ACETAMINOPHEN PRN TAB: 5; 325 TABLET ORAL at 17:51

## 2021-12-02 NOTE — PHYSICAL THERAPY DAILY NOTE
PT Daily Note-Current


Subjective


Patient reluctantly agrees to PT.





Pain





   Numeric Pain Scale:  8


   Location:  Right


   Location Body Site:  Knee


   Pain Description:  Acute





Mental Status


Patient Orientation:  Normal For Age


Attachments:  Polar Pack, IV





Transfers


SCALE: Activities may be completed with or without assistive devices.





6-Indepedent-patient completes the activity by him/herself with no assistance 

from a helper.


5-Set-up or Clean-up Assistance-helper sets up or cleans up; patient completes 

activity. [place] assists only prior to or  


    following the activity.


4-Supervision or Touching Assistance-helper provides verbal cues and/or 

touching/steadying and/or contact guard assistance as patient completes 

activity. Assistance may be provided   


    throughout the activity or intermittently.


3-Partial/Moderate Assistance-helper does LESS THAN HALF the effort. [place] 

lifts, holds or supports trunk or limbs, but provides less than half the effort.


2-Substantial/Maximal Assistance-helper does MORE THAN HALF the effort. [place] 

lifts or holds trunk or limbs and provides more than half the effort.


1-Sqgtwvziw-supehd does ALL the effort. Patient does none of the effort to 

complete the activity. Or, the assistance of 2 or more helpers is required for 

the patient to complete the  


    activity.


If activity was not attempted, code reason:


7-Patient Refused.


9-Not Applicable-not attempted and the patient did not perform the activity 

before the current illness, exacerbation or injury.


10-Not Attempted due to Environmental Limitations-(lack of equipment, weather 

restraints, etc.).


88-Not Attempted due to Medical Conditions or Safety Concerns.


Sit to Lying (QC):  6


Lying to Sitting/Side of Bed(Q:  6


Sit to Stand (QC):  6





Weight Bearing


Right Lower Extremity:  Right


Weight Bearing/Tolerated





Gait Training


Does the Patient Walk?:  Yes


Distance:  250'


Walk 10 feet (QC):  4


Walk 50 ft with 2 Turns(QC):  4


Walk 150 ft (QC):  4


Gait Assistive Device:  FWW


steady, reciprocal pattern





Exercises


Supine Ex:  Ankle pumps, Quad Set, Heel Slides, Straight leg raise


Supine Reps:  12


Seated Therapy Exercises:  Long arc quads


Seated Reps:  12





Assessment


Patient declined CPM.  Patient progressing with treatment plan.





PT Long Term Goals


Long Term Goals


PT Long Term Goals Time Frame:  Dec 8, 2021


Roll Left & Right (QC):  6


Sit to Lying (QC):  6


Lying-Sitting on Side/Bed(QC):  6


Sit to Stand (QC):  6


Chair/Bed-to-Chair Xfer(QC):  6


Walk 10 feet (QC):  6


Walk 50ft with 2 Turns (QC):  6


Walk 150 ft (QC):  6





PT Plan


Treatment/Plan


Treatment Plan:  Continue Plan of Care


Treatment Plan:  Bed Mobility, Education, Functional Activity Krupa, Functional 

Strength, Gait, Safety, Therapeutic Exercise, Transfers


Treatment Duration:  Dec 8, 2021


Frequency:  11 times per week


Estimated Hrs Per Day:  .25 hour per day


Patient and/or Family Agrees t:  Yes





Time/GCodes


Time In:  1355


Time Out:  1410


Total Billed Treatment Time:  15


Total Billed Treatment


1 visit


FA 15 min











ELSI GE PT               Dec 2, 2021 14:37

## 2021-12-02 NOTE — ANESTHESIA-GENERAL POST-OP
General


Patient Condition


Mental Status/LOC:  Same as Preop


Cardiovascular:  Satisfactory


Nausea/Vomiting:  Absent


Respiratory:  Satisfactory


Pain:  Controlled


Complications:  Absent





Post Op Complications


Complications


None





Follow Up Care/Instructions


Patient Instructions


None needed.





Anesthesia/Patient Condition


Patient Condition


Patient is doing well, no complaints, stable vital signs, no apparent adverse 

anesthesia problems.   


No complications reported per nursing.











UVALDO CEBALLOS CRNA               Dec 2, 2021 08:06

## 2021-12-02 NOTE — PROGRESS NOTE
Standard Progress Note


Progress Notes/Assess & Plan


Date Seen by a Provider:  Dec 2, 2021


Time Seen by a Provider:  07:58


Progress/Assessment & Plan


post op check 


no complaints


radiographs--HW well positioned without fracture


RLE--2 plus DP pulse with brisck cap refill


s/p R TKA 


mobilize as able


Final Diagnosis


no complaints





Vital Signs








  Date Time  Temp Pulse Resp B/P (MAP) Pulse Ox O2 Delivery O2 Flow Rate FiO2


 


12/2/21 06:05   20     


 


12/2/21 04:00 35.9 59 20 118/73 (88) 95 Room Air  


 


12/2/21 00:00 36.4 74 20 122/91 (101) 95 Room Air  


 


12/1/21 20:35 35.8 67 18 134/69 (90) 96 Room Air  


 


12/1/21 20:20     93 Room Air  


 


12/1/21 18:36   20     


 


12/1/21 15:43 36.0 69 20 122/77 (92) 93 Room Air  


 


12/1/21 14:00 35.9 65 18 144/94 (111) 99 Nasal Cannula  


 


12/1/21 14:00     93 Room Air 2.00 


 


12/1/21 13:57   16     


 


12/1/21 12:00      Nasal Cannula 2 


 


12/1/21 12:00 36.1  12 132/88 (103) 95 Nasal Cannula 2 


 


12/1/21 11:50   8 142/84 (103) 94 OxyMask 1 


 


12/1/21 11:48      OxyMask 1 


 


12/1/21 11:40   10 135/89 (104) 96 OxyMask 2 


 


12/1/21 11:33      OxyMask 8 


 


12/1/21 11:30   8 134/89 (104) 98 OxyMask 2 


 


12/1/21 11:20   8 129/92 (104) 100 OxyMask 8 


 


12/1/21 11:19      OxyMask 8 


 


12/1/21 11:10   18 122/84 (97) 100 OxyMask 8 


 


12/1/21 11:03      OxyMask 8 


 


12/1/21 11:03 36.1  18 119/77 (91) 96 OxyMask 8 














I & O 


 


 12/2/21





 07:00


 


Intake Total 2790 ml


 


Balance 2790 ml








Laboratory Tests








Test


 12/2/21


05:20 Range/Units


 


 


White Blood Count


 9.0 


 4.3-11.0


10^3/uL


 


Red Blood Count


 3.79 L


 3.80-5.11


10^6/uL


 


Hemoglobin 10.6 L 11.5-16.0  g/dL


 


Hematocrit 33 L 35-52  %


 


Mean Corpuscular Volume 88  80-99  fL


 


Mean Corpuscular Hemoglobin 28  25-34  pg


 


Mean Corpuscular Hemoglobin


Concent 32 


 32-36  g/dL





 


Red Cell Distribution Width 17.4 H 10.0-14.5  %


 


Platelet Count


 244 


 130-400


10^3/uL


 


Mean Platelet Volume 10.5  9.0-12.2  fL


 


Immature Granulocyte % (Auto) 0   %


 


Neutrophils (%) (Auto) 81 H 42-75  %


 


Lymphocytes (%) (Auto) 9 L 12-44  %


 


Monocytes (%) (Auto) 10  0-12  %


 


Eosinophils (%) (Auto) 0  0-10  %


 


Basophils (%) (Auto) 0  0-10  %


 


Neutrophils # (Auto)


 7.3 


 1.8-7.8


10^3/uL


 


Lymphocytes # (Auto)


 0.8 L


 1.0-4.0


10^3/uL


 


Monocytes # (Auto)


 0.9 


 0.0-1.0


10^3/uL


 


Eosinophils # (Auto)


 0.0 


 0.0-0.3


10^3/uL


 


Basophils # (Auto)


 0.0 


 0.0-0.1


10^3/uL


 


Immature Granulocyte # (Auto)


 0.0 


 0.0-0.1


10^3/uL


 


Sodium Level 136  135-145  MMOL/L


 


Potassium Level 4.3  3.6-5.0  MMOL/L


 


Chloride Level 103    MMOL/L


 


Carbon Dioxide Level 24  21-32  MMOL/L


 


Anion Gap 9  5-14  MMOL/L


 


Blood Urea Nitrogen 14  7-18  MG/DL


 


Creatinine


 1.00 


 0.60-1.30


MG/DL


 


Estimat Glomerular Filtration


Rate 57 


  





 


BUN/Creatinine Ratio 14   


 


Glucose Level 96    MG/DL


 


Calcium Level 8.6  8.5-10.1  MG/DL


 


Corrected Calcium 8.8  8.5-10.1  MG/DL


 


Total Bilirubin 0.3  0.1-1.0  MG/DL


 


Aspartate Amino Transf


(AST/SGOT) 16 


 5-34  U/L





 


Alanine Aminotransferase


(ALT/SGPT) 17 


 0-55  U/L





 


Alkaline Phosphatase 65    U/L


 


Total Protein 6.6  6.4-8.2  GM/DL


 


Albumin 3.8  3.2-4.5  GM/DL





RLE--dressing intact. Intact DF and PF of toes and ankle


sensation intact throughout


s/p RTKA


PT/OT











STONEY GASPAR MD             Dec 2, 2021 07:59

## 2021-12-02 NOTE — PHYSICAL THERAPY DAILY NOTE
PT Daily Note-Current


Subjective


Patient rates right knee pain 10/10 with meds and PCA.





Pain





   Numeric Pain Scale:  10-Worst Possible Pain


   Location:  Right


   Location Body Site:  Knee


   Pain Description:  Acute





Mental Status


Patient Orientation:  Normal For Age


Attachments:  Polar Pack, IV





Transfers


SCALE: Activities may be completed with or without assistive devices.





6-Indepedent-patient completes the activity by him/herself with no assistance 

from a helper.


5-Set-up or Clean-up Assistance-helper sets up or cleans up; patient completes 

activity. [place] assists only prior to or  


    following the activity.


4-Supervision or Touching Assistance-helper provides verbal cues and/or 

touching/steadying and/or contact guard assistance as patient completes 

activity. Assistance may be provided   


    throughout the activity or intermittently.


3-Partial/Moderate Assistance-helper does LESS THAN HALF the effort. [place] 

lifts, holds or supports trunk or limbs, but provides less than half the effort.


2-Substantial/Maximal Assistance-helper does MORE THAN HALF the effort. [place] 

lifts or holds trunk or limbs and provides more than half the effort.


2-Iwyvuhils-ucaivg does ALL the effort. Patient does none of the effort to 

complete the activity. Or, the assistance of 2 or more helpers is required for 

the patient to complete the  


    activity.


If activity was not attempted, code reason:


7-Patient Refused.


9-Not Applicable-not attempted and the patient did not perform the activity 

before the current illness, exacerbation or injury.


10-Not Attempted due to Environmental Limitations-(lack of equipment, weather 

restraints, etc.).


88-Not Attempted due to Medical Conditions or Safety Concerns.


Lying to Sitting/Side of Bed(Q:  6


Sit to Stand (QC):  4


Chair/Bed-to-Chair Xfer(QC):  4


Toilet Transfer (QC):  4


SBA with upright mobility





Weight Bearing


Right Lower Extremity:  Right


Weight Bearing/Tolerated





Gait Training


Does the Patient Walk?:  Yes


Distance:  225'


Walk 10 feet (QC):  4


Walk 50 ft with 2 Turns(QC):  4


Walk 150 ft (QC):  4


Gait Assistive Device:  FWW


slow, antalgic step to gait sequence





Exercises


Supine Ex:  Ankle pumps, Quad Set, Heel Slides, Straight leg raise


Supine Reps:  15


Seated Therapy Exercises:  Long arc quads


Seated Reps:  15





Assessment


Patient progressing with treatment plan and is up in recliner with needs met.  

Increase activity as tolerated by patient.  Patient will dismiss to home with 

friend per patient report.





PT Long Term Goals


Long Term Goals


PT Long Term Goals Time Frame:  Dec 8, 2021


Roll Left & Right (QC):  6


Sit to Lying (QC):  6


Lying-Sitting on Side/Bed(QC):  6


Sit to Stand (QC):  6


Chair/Bed-to-Chair Xfer(QC):  6


Walk 10 feet (QC):  6


Walk 50ft with 2 Turns (QC):  6


Walk 150 ft (QC):  6





PT Plan


Treatment/Plan


Treatment Plan:  Continue Plan of Care


Treatment Plan:  Bed Mobility, Education, Functional Activity Krupa, Functional 

Strength, Gait, Safety, Therapeutic Exercise, Transfers


Treatment Duration:  Dec 8, 2021


Frequency:  11 times per week


Estimated Hrs Per Day:  .25 hour per day


Patient and/or Family Agrees t:  Yes





Time/GCodes


Time In:  848


Time Out:  911


Total Billed Treatment Time:  23


Total Billed Treatment


1 visit


EX 9 min


GT 14 min











ELSI GE PT               Dec 2, 2021 10:09

## 2021-12-02 NOTE — OCC THERAPY PROGRESS NOTE
Therapy Progress Note


OT orders received and chart reviewed. OT visited with pt. Upon introducing self

to pt, she states "I don't have an occupation". OT educated her on the purpose 

and benefit of OT with focus on self care, she replied "I'm fully capable". Pt 

declined further OT services, as she has already had her L knee replaced and 

knows what to expect at discharge, and doesn't wish to have further services. 

Per PT note, pt is currently SBA with ambulation using FWW, 225'. No skilled OT 

services indicated at this time, as pt refuses further OT service and indicates 

she is at her PLOF. D/C from OT. 





1, visit


1015











CARMEN SUTHERLAND OT           Dec 2, 2021 10:40

## 2021-12-02 NOTE — PROGRESS NOTE - HOSPITALIST
KELLY ROWLAND 12/2/21 1235:


Subjective


HPI/CC On Admission


Date Seen by Provider:  Dec 2, 2021


Time Seen by Provider:  08:00


CC: Right knee replacement





HPI: This is a 56yoWF clinic patient of [facility] who has a h/o mental illness who 

presents after an uncomplicated RTKR. Patient is doing well and has no complaint

s.


Subjective/Events-last exam


Patient is post-op day 1 from a right total knee arthroplasty. States she is 

feeling good this morning and that her pain is well managed. Patient denies N/V,

fever, chills, SOB, dysuria. Patients WBC 9.0 and Hgb is 10.6





Objective


Exam


Vital Signs





Vital Signs








  Date Time  Temp Pulse Resp B/P (MAP) Pulse Ox O2 Delivery O2 Flow Rate FiO2


 


12/2/21 11:46 36.1 59 18 129/82 (98) 95 Room Air  


 


12/1/21 14:00       2.00 





Capillary Refill : Less Than 3 Seconds


General Appearance:  No Apparent Distress, WD/WN


HEENT:  PERRL/EOMI, Moist Mucous Membranes


Neck:  Non Tender, Supple


Respiratory:  Chest Non Tender, Lungs Clear, Normal Breath Sounds, No Accessory 

Muscle Use, No Respiratory Distress


Cardiovascular:  Regular Rate, Rhythm, Normal Peripheral Pulses


Gastrointestinal:  Non Tender, Soft


Rectal:  Deferred


Extremity:  Normal Capillary Refill, Non Tender, No Calf Tenderness


Neurologic/Psychiatric:  Alert, Oriented x3, Normal Mood/Affect


Skin:  Normal Color, Warm/Dry





Results/Procedures


Lab


Laboratory Tests


12/2/21 05:20








Patient resulted labs reviewed.





Assessment/Plan


Assessment and Plan


Assess & Plan/Chief Complaint


Assessment


Status-post right knee total arthroplasty 


Hypothyroidism 


Bipolar disorder





Plan


Continue pain management 


Stool softener 


Continue compression devices


Incentive spirometer 


Continue home meds





KAREN GROVES DO 12/3/21 0507:


Subjective


Subjective/Events-last exam


Pt doing well


Pain is okay


Hgb 10.6


Maintain on bowel regimen


Restarted all home meds yesterday


Review of Systems


Musculoskeletal:  leg pain





Objective


Exam


General Appearance:  No Apparent Distress, WD/WN, Chronically ill


Respiratory:  Lungs Clear, Normal Breath Sounds


Cardiovascular:  Regular Rate, Rhythm


Neurologic/Psychiatric:  Alert, Oriented x3, No Motor/Sensory Deficits, Normal 

Mood/Affect





Assessment/Plan


Assessment and Plan


Assess & Plan/Chief Complaint


Supportive care


Bowel regimen


Pain control





Supervisory-Addendum Brief


Verification & Attestation


Participated in pt care:  history, MDM, physical


Personally performed:  exam, history, MDM, supervision of care


Care discussed with:  Medical Student


Procedures:  n/a


Results interpretation:  Verified all documentation


Verification and Attestation of Medical Student E/M Service





A medical student performed and documented this service in my presence. I 

reviewed and verified all information documented by the medical student and made

modifications to such information, when appropriate. I personally performed the 

physical exam and medical decision making. 





 Karen Groves Dec 3, 2021,05:06











KELLY ROWLAND                  Dec 2, 2021 12:35


KAREN GROVES DO                 Dec 3, 2021 05:07

## 2021-12-03 VITALS — DIASTOLIC BLOOD PRESSURE: 90 MMHG | SYSTOLIC BLOOD PRESSURE: 160 MMHG

## 2021-12-03 VITALS — DIASTOLIC BLOOD PRESSURE: 68 MMHG | SYSTOLIC BLOOD PRESSURE: 141 MMHG

## 2021-12-03 VITALS — DIASTOLIC BLOOD PRESSURE: 79 MMHG | SYSTOLIC BLOOD PRESSURE: 164 MMHG

## 2021-12-03 LAB
ALBUMIN SERPL-MCNC: 3.8 GM/DL (ref 3.2–4.5)
ALP SERPL-CCNC: 69 U/L (ref 40–136)
ALT SERPL-CCNC: 17 U/L (ref 0–55)
BASOPHILS # BLD AUTO: 0 10^3/UL (ref 0–0.1)
BASOPHILS NFR BLD AUTO: 1 % (ref 0–10)
BILIRUB SERPL-MCNC: 0.4 MG/DL (ref 0.1–1)
BUN/CREAT SERPL: 11
CALCIUM SERPL-MCNC: 8.6 MG/DL (ref 8.5–10.1)
CHLORIDE SERPL-SCNC: 101 MMOL/L (ref 98–107)
CO2 SERPL-SCNC: 25 MMOL/L (ref 21–32)
CREAT SERPL-MCNC: 0.83 MG/DL (ref 0.6–1.3)
EOSINOPHIL # BLD AUTO: 0.1 10^3/UL (ref 0–0.3)
EOSINOPHIL NFR BLD AUTO: 2 % (ref 0–10)
GFR SERPLBLD BASED ON 1.73 SQ M-ARVRAT: 71 ML/MIN
GLUCOSE SERPL-MCNC: 114 MG/DL (ref 70–105)
HCT VFR BLD CALC: 33 % (ref 35–52)
HGB BLD-MCNC: 10.7 G/DL (ref 11.5–16)
LYMPHOCYTES # BLD AUTO: 1 10^3/UL (ref 1–4)
LYMPHOCYTES NFR BLD AUTO: 22 % (ref 12–44)
MANUAL DIFFERENTIAL PERFORMED BLD QL: NO
MCH RBC QN AUTO: 28 PG (ref 25–34)
MCHC RBC AUTO-ENTMCNC: 32 G/DL (ref 32–36)
MCV RBC AUTO: 88 FL (ref 80–99)
MONOCYTES # BLD AUTO: 0.5 10^3/UL (ref 0–1)
MONOCYTES NFR BLD AUTO: 10 % (ref 0–12)
NEUTROPHILS # BLD AUTO: 3 10^3/UL (ref 1.8–7.8)
NEUTROPHILS NFR BLD AUTO: 65 % (ref 42–75)
PLATELET # BLD: 238 10^3/UL (ref 130–400)
PMV BLD AUTO: 10.5 FL (ref 9–12.2)
POTASSIUM SERPL-SCNC: 4 MMOL/L (ref 3.6–5)
PROT SERPL-MCNC: 6.7 GM/DL (ref 6.4–8.2)
SODIUM SERPL-SCNC: 135 MMOL/L (ref 135–145)
WBC # BLD AUTO: 4.6 10^3/UL (ref 4.3–11)

## 2021-12-03 RX ADMIN — ENOXAPARIN SODIUM SCH MG: 30 INJECTION SUBCUTANEOUS at 06:15

## 2021-12-03 RX ADMIN — DOCUSATE SODIUM AND SENNOSIDES SCH EA: 8.6; 5 TABLET, FILM COATED ORAL at 08:20

## 2021-12-03 RX ADMIN — OXYCODONE HYDROCHLORIDE AND ACETAMINOPHEN PRN TAB: 5; 325 TABLET ORAL at 06:33

## 2021-12-03 RX ADMIN — OXYCODONE HYDROCHLORIDE AND ACETAMINOPHEN PRN TAB: 5; 325 TABLET ORAL at 03:26

## 2021-12-03 RX ADMIN — OXYCODONE HYDROCHLORIDE AND ACETAMINOPHEN PRN TAB: 5; 325 TABLET ORAL at 10:36

## 2021-12-03 RX ADMIN — Medication SCH EA: at 06:15

## 2021-12-03 RX ADMIN — LEVOTHYROXINE SODIUM SCH MCG: 75 TABLET ORAL at 06:15

## 2021-12-03 RX ADMIN — OXYCODONE HYDROCHLORIDE AND ACETAMINOPHEN PRN TAB: 5; 325 TABLET ORAL at 00:20

## 2021-12-03 RX ADMIN — LEVOTHYROXINE SODIUM SCH MCG: 100 TABLET ORAL at 06:15

## 2021-12-03 RX ADMIN — OXYCODONE HYDROCHLORIDE AND ACETAMINOPHEN PRN TAB: 5; 325 TABLET ORAL at 08:20

## 2021-12-03 RX ADMIN — ASPIRIN SCH MG: 81 TABLET ORAL at 08:19

## 2021-12-03 NOTE — PROGRESS NOTE
Standard Progress Note


Progress Notes/Assess & Plan


Date Seen by a Provider:  Dec 3, 2021


Time Seen by a Provider:  07:04


Progress/Assessment & Plan


post op check 


no complaints


radiographs--HW well positioned without fracture


RLE--2 plus DP pulse with brisck cap refill


s/p R TKA 


mobilize as able


Final Diagnosis


no complaints





Vital Signs








  Date Time  Temp Pulse Resp B/P (MAP) Pulse Ox O2 Delivery O2 Flow Rate FiO2


 


12/3/21 06:33   16     


 


12/3/21 04:00 36.6 76 16 141/68 (92) 95 Room Air  


 


12/3/21 00:30 36.2 72 16 160/90 (113) 97 Room Air  


 


12/2/21 20:18 35.6 74 18 144/86 (105) 95 Room Air  


 


12/2/21 20:15      Room Air  


 


12/2/21 18:40   20     


 


12/2/21 15:38 36.7 72 18 138/74 (95) 96 Room Air  


 


12/2/21 11:46 36.1 59 18 129/82 (98) 95 Room Air  


 


12/2/21 09:00      Room Air  


 


12/2/21 08:00 35.5 56 20 113/76 (88) 97 Room Air  














I & O 


 


 12/3/21





 07:00


 


Intake Total 1215 ml


 


Balance 1215 ml








Laboratory Tests








Test


 12/3/21


06:11 Range/Units


 


 


White Blood Count


 4.6 


 4.3-11.0


10^3/uL


 


Red Blood Count


 3.80 


 3.80-5.11


10^6/uL


 


Hemoglobin 10.7 L 11.5-16.0  g/dL


 


Hematocrit 33 L 35-52  %


 


Mean Corpuscular Volume 88  80-99  fL


 


Mean Corpuscular Hemoglobin 28  25-34  pg


 


Mean Corpuscular Hemoglobin


Concent 32 


 32-36  g/dL





 


Red Cell Distribution Width 17.4 H 10.0-14.5  %


 


Platelet Count


 238 


 130-400


10^3/uL


 


Mean Platelet Volume 10.5  9.0-12.2  fL


 


Immature Granulocyte % (Auto) 0   %


 


Neutrophils (%) (Auto) 65  42-75  %


 


Lymphocytes (%) (Auto) 22  12-44  %


 


Monocytes (%) (Auto) 10  0-12  %


 


Eosinophils (%) (Auto) 2  0-10  %


 


Basophils (%) (Auto) 1  0-10  %


 


Neutrophils # (Auto)


 3.0 


 1.8-7.8


10^3/uL


 


Lymphocytes # (Auto)


 1.0 


 1.0-4.0


10^3/uL


 


Monocytes # (Auto)


 0.5 


 0.0-1.0


10^3/uL


 


Eosinophils # (Auto)


 0.1 


 0.0-0.3


10^3/uL


 


Basophils # (Auto)


 0.0 


 0.0-0.1


10^3/uL


 


Immature Granulocyte # (Auto)


 0.0 


 0.0-0.1


10^3/uL


 


Sodium Level 135  135-145  MMOL/L


 


Potassium Level 4.0  3.6-5.0  MMOL/L


 


Chloride Level 101    MMOL/L


 


Carbon Dioxide Level 25  21-32  MMOL/L


 


Anion Gap 9  5-14  MMOL/L


 


Blood Urea Nitrogen 9  7-18  MG/DL


 


Creatinine


 0.83 


 0.60-1.30


MG/DL


 


Estimat Glomerular Filtration


Rate 71 


  





 


BUN/Creatinine Ratio 11   


 


Glucose Level 114 H   MG/DL


 


Calcium Level 8.6  8.5-10.1  MG/DL


 


Corrected Calcium 8.8  8.5-10.1  MG/DL


 


Total Bilirubin 0.4  0.1-1.0  MG/DL


 


Aspartate Amino Transf


(AST/SGOT) 19 


 5-34  U/L





 


Alanine Aminotransferase


(ALT/SGPT) 17 


 0-55  U/L





 


Alkaline Phosphatase 69    U/L


 


Total Protein 6.7  6.4-8.2  GM/DL


 


Albumin 3.8  3.2-4.5  GM/DL





RLE--incision clean and dry. No calf tenderness. Neg Aaron's


s/p RTKA doing well


DC after PT today











STONEY GASPAR MD             Dec 3, 2021 07:05

## 2021-12-03 NOTE — DISCHARGE SUMMARY
DATE OF SERVICE:  



DIAGNOSES:

1.  Right knee primary osteoarthritis.

2.  Constipation.

3.  Reflux.

4.  Allergic rhinitis.

5.  Depression.

6.  Hypothyroidism.

7.  Bipolar disorder.

8.  Seizure disorder.



PROCEDURE:

Right total knee arthroplasty.



SUMMARY:

The patient is a 56-year-old female who underwent a right total knee

arthroplasty on the day of admission.  Postoperatively, she did well.  At the

time of discharge, her wound was clean and dry.  She had no calf tenderness. 

Negative Homans sign.



CONDITION AT DISCHARGE:

Good.



DISCHARGE DIET:

Regular.



FOLLOWUP:

Followup is in 3 weeks.



DISCHARGE MEDICATIONS:

Home medications, aspirin one per day for 4 weeks and Percocet as needed for

pain.





Job ID: [id number]

DocumentID: [id number]

Dictated Date:  12/02/2021 16:39:03

Transcription Date: 12/03/2021 10:01:31

Dictated By: STONEY GASPAR MD

## 2021-12-03 NOTE — PHYSICAL THERAPY DAILY NOTE
PT Daily Note-Current


Subjective


Patient reports she slept well.  Agrees to PT.





Pain





   Numeric Pain Scale:  8


   Location:  Right


   Location Body Site:  Knee


   Pain Description:  Acute


   Comment:  with meds issued





Mental Status


Patient Orientation:  Normal For Age





Transfers


SCALE: Activities may be completed with or without assistive devices.





6-Indepedent-patient completes the activity by him/herself with no assistance 

from a helper.


5-Set-up or Clean-up Assistance-helper sets up or cleans up; patient completes 

activity. [place] assists only prior to or  


    following the activity.


4-Supervision or Touching Assistance-helper provides verbal cues and/or alecia

opal/steadying and/or contact guard assistance as patient completes activity. 

Assistance may be provided   


    throughout the activity or intermittently.


3-Partial/Moderate Assistance-helper does LESS THAN HALF the effort. [place] 

lifts, holds or supports trunk or limbs, but provides less than half the effort.


2-Substantial/Maximal Assistance-helper does MORE THAN HALF the effort. [place] 

lifts or holds trunk or limbs and provides more than half the effort.


7-Gihewxins-elxgri does ALL the effort. Patient does none of the effort to 

complete the activity. Or, the assistance of 2 or more helpers is required for 

the patient to complete the  


    activity.


If activity was not attempted, code reason:


7-Patient Refused.


9-Not Applicable-not attempted and the patient did not perform the activity 

before the current illness, exacerbation or injury.


10-Not Attempted due to Environmental Limitations-(lack of equipment, weather 

restraints, etc.).


88-Not Attempted due to Medical Conditions or Safety Concerns.


Lying to Sitting/Side of Bed(Q:  6


Sit to Stand (QC):  6


Chair/Bed-to-Chair Xfer(QC):  6





Weight Bearing


Right Lower Extremity:  Right


Weight Bearing/Tolerated





Gait Training


Does the Patient Walk?:  Yes


Distance:  250'


Walk 10 feet (QC):  6


Walk 50 ft with 2 Turns(QC):  6


Walk 150 ft (QC):  6


Gait Assistive Device:  FWW


reciprocal pattern





Stair Training


1 Step (curb) (QC):  7


4 Steps (QC):  7





Exercises


Supine Ex:  Ankle pumps, Quad Set, Heel Slides (limited due to pain/resists 

AAROM), Straight leg raise


Supine Reps:  12





Assessment


Patient tolerated treatment well and will dismiss to home with friend assist 

this a.m.   Patient encouraged to perform HEP PRN at home and increase right 

knee flexion with static technique.  Patient voices understanding.





PT Long Term Goals


Long Term Goals


PT Long Term Goals Time Frame:  Dec 8, 2021


Roll Left & Right (QC):  6


Sit to Lying (QC):  6


Lying-Sitting on Side/Bed(QC):  6


Sit to Stand (QC):  6


Chair/Bed-to-Chair Xfer(QC):  6


Walk 10 feet (QC):  6


Walk 50ft with 2 Turns (QC):  6


Walk 150 ft (QC):  6





PT Plan


Treatment/Plan


Treatment Plan:  Discontinue PT


Treatment Plan:  Bed Mobility, Education, Functional Activity Krupa, Functional 

Strength, Gait, Safety, Therapeutic Exercise, Transfers


Treatment Duration:  Dec 8, 2021


Frequency:  11 times per week


Estimated Hrs Per Day:  .25 hour per day


Patient and/or Family Agrees t:  Yes





Time/GCodes


Time In:  800


Time Out:  812


Total Billed Treatment Time:  12


Total Billed Treatment


1 visit


FA 12 min











ELSI GE PT               Dec 3, 2021 10:06